# Patient Record
Sex: FEMALE | Race: BLACK OR AFRICAN AMERICAN | NOT HISPANIC OR LATINO | URBAN - METROPOLITAN AREA
[De-identification: names, ages, dates, MRNs, and addresses within clinical notes are randomized per-mention and may not be internally consistent; named-entity substitution may affect disease eponyms.]

---

## 2022-01-01 ENCOUNTER — INPATIENT (INPATIENT)
Facility: HOSPITAL | Age: 22
LOS: 0 days | Discharge: LEFT BEFORE DISCHARGE | End: 2022-01-02
Attending: STUDENT IN AN ORGANIZED HEALTH CARE EDUCATION/TRAINING PROGRAM | Admitting: STUDENT IN AN ORGANIZED HEALTH CARE EDUCATION/TRAINING PROGRAM
Payer: COMMERCIAL

## 2022-01-01 VITALS
TEMPERATURE: 102 F | RESPIRATION RATE: 18 BRPM | OXYGEN SATURATION: 100 % | HEART RATE: 124 BPM | DIASTOLIC BLOOD PRESSURE: 79 MMHG | SYSTOLIC BLOOD PRESSURE: 124 MMHG

## 2022-01-01 DIAGNOSIS — U07.1 COVID-19: ICD-10-CM

## 2022-01-01 LAB
ALBUMIN SERPL ELPH-MCNC: 4.4 G/DL — SIGNIFICANT CHANGE UP (ref 3.3–5)
ALP SERPL-CCNC: 87 U/L — SIGNIFICANT CHANGE UP (ref 40–120)
ALT FLD-CCNC: 33 U/L — SIGNIFICANT CHANGE UP (ref 4–33)
ANION GAP SERPL CALC-SCNC: 11 MMOL/L — SIGNIFICANT CHANGE UP (ref 7–14)
APPEARANCE CSF: CLEAR — SIGNIFICANT CHANGE UP
APPEARANCE SPUN FLD: COLORLESS — SIGNIFICANT CHANGE UP
APPEARANCE UR: CLEAR — SIGNIFICANT CHANGE UP
APTT BLD: 30.9 SEC — SIGNIFICANT CHANGE UP (ref 27–36.3)
AST SERPL-CCNC: 29 U/L — SIGNIFICANT CHANGE UP (ref 4–32)
B PERT DNA SPEC QL NAA+PROBE: SIGNIFICANT CHANGE UP
B PERT+PARAPERT DNA PNL SPEC NAA+PROBE: SIGNIFICANT CHANGE UP
BACTERIA # UR AUTO: NEGATIVE — SIGNIFICANT CHANGE UP
BASOPHILS # BLD AUTO: 0.02 K/UL — SIGNIFICANT CHANGE UP (ref 0–0.2)
BASOPHILS NFR BLD AUTO: 0.3 % — SIGNIFICANT CHANGE UP (ref 0–2)
BILIRUB SERPL-MCNC: <0.2 MG/DL — SIGNIFICANT CHANGE UP (ref 0.2–1.2)
BILIRUB UR-MCNC: NEGATIVE — SIGNIFICANT CHANGE UP
BLOOD GAS VENOUS COMPREHENSIVE RESULT: SIGNIFICANT CHANGE UP
BORDETELLA PARAPERTUSSIS (RAPRVP): SIGNIFICANT CHANGE UP
BUN SERPL-MCNC: 7 MG/DL — SIGNIFICANT CHANGE UP (ref 7–23)
C PNEUM DNA SPEC QL NAA+PROBE: SIGNIFICANT CHANGE UP
CALCIUM SERPL-MCNC: 9.3 MG/DL — SIGNIFICANT CHANGE UP (ref 8.4–10.5)
CHLORIDE SERPL-SCNC: 105 MMOL/L — SIGNIFICANT CHANGE UP (ref 98–107)
CO2 SERPL-SCNC: 22 MMOL/L — SIGNIFICANT CHANGE UP (ref 22–31)
COLOR CSF: COLORLESS — SIGNIFICANT CHANGE UP
COLOR SPEC: SIGNIFICANT CHANGE UP
CREAT SERPL-MCNC: 0.74 MG/DL — SIGNIFICANT CHANGE UP (ref 0.5–1.3)
CRP SERPL-MCNC: 6.2 MG/L — HIGH
DIFF PNL FLD: NEGATIVE — SIGNIFICANT CHANGE UP
EOSINOPHIL # BLD AUTO: 0.05 K/UL — SIGNIFICANT CHANGE UP (ref 0–0.5)
EOSINOPHIL NFR BLD AUTO: 0.8 % — SIGNIFICANT CHANGE UP (ref 0–6)
EPI CELLS # UR: 1 /HPF — SIGNIFICANT CHANGE UP (ref 0–5)
FLUAV SUBTYP SPEC NAA+PROBE: SIGNIFICANT CHANGE UP
FLUBV RNA SPEC QL NAA+PROBE: SIGNIFICANT CHANGE UP
GLUCOSE CSF-MCNC: 59 MG/DL — SIGNIFICANT CHANGE UP (ref 40–70)
GLUCOSE SERPL-MCNC: 94 MG/DL — SIGNIFICANT CHANGE UP (ref 70–99)
GLUCOSE UR QL: NEGATIVE — SIGNIFICANT CHANGE UP
HADV DNA SPEC QL NAA+PROBE: SIGNIFICANT CHANGE UP
HCOV 229E RNA SPEC QL NAA+PROBE: SIGNIFICANT CHANGE UP
HCOV HKU1 RNA SPEC QL NAA+PROBE: SIGNIFICANT CHANGE UP
HCOV NL63 RNA SPEC QL NAA+PROBE: SIGNIFICANT CHANGE UP
HCOV OC43 RNA SPEC QL NAA+PROBE: SIGNIFICANT CHANGE UP
HCT VFR BLD CALC: 36.8 % — SIGNIFICANT CHANGE UP (ref 34.5–45)
HGB BLD-MCNC: 11.6 G/DL — SIGNIFICANT CHANGE UP (ref 11.5–15.5)
HIV 1+2 AB+HIV1 P24 AG SERPL QL IA: SIGNIFICANT CHANGE UP
HMPV RNA SPEC QL NAA+PROBE: SIGNIFICANT CHANGE UP
HPIV1 RNA SPEC QL NAA+PROBE: SIGNIFICANT CHANGE UP
HPIV2 RNA SPEC QL NAA+PROBE: SIGNIFICANT CHANGE UP
HPIV3 RNA SPEC QL NAA+PROBE: SIGNIFICANT CHANGE UP
HPIV4 RNA SPEC QL NAA+PROBE: SIGNIFICANT CHANGE UP
IANC: 4.83 K/UL — SIGNIFICANT CHANGE UP (ref 1.5–8.5)
IMM GRANULOCYTES NFR BLD AUTO: 0.5 % — SIGNIFICANT CHANGE UP (ref 0–1.5)
INR BLD: 1.23 RATIO — HIGH (ref 0.88–1.16)
KETONES UR-MCNC: NEGATIVE — SIGNIFICANT CHANGE UP
LEUKOCYTE ESTERASE UR-ACNC: NEGATIVE — SIGNIFICANT CHANGE UP
LYMPHOCYTES # BLD AUTO: 0.53 K/UL — LOW (ref 1–3.3)
LYMPHOCYTES # BLD AUTO: 8.9 % — LOW (ref 13–44)
LYMPHOCYTES # CSF: 81 % — SIGNIFICANT CHANGE UP
M PNEUMO DNA SPEC QL NAA+PROBE: SIGNIFICANT CHANGE UP
MCHC RBC-ENTMCNC: 28 PG — SIGNIFICANT CHANGE UP (ref 27–34)
MCHC RBC-ENTMCNC: 31.5 GM/DL — LOW (ref 32–36)
MCV RBC AUTO: 88.9 FL — SIGNIFICANT CHANGE UP (ref 80–100)
MONOCYTES # BLD AUTO: 0.49 K/UL — SIGNIFICANT CHANGE UP (ref 0–0.9)
MONOCYTES NFR BLD AUTO: 8.2 % — SIGNIFICANT CHANGE UP (ref 2–14)
MONOS+MACROS NFR CSF: 13 % — SIGNIFICANT CHANGE UP
NEUTROPHILS # BLD AUTO: 4.83 K/UL — SIGNIFICANT CHANGE UP (ref 1.8–7.4)
NEUTROPHILS # CSF: 6 % — SIGNIFICANT CHANGE UP
NEUTROPHILS NFR BLD AUTO: 81.3 % — HIGH (ref 43–77)
NITRITE UR-MCNC: NEGATIVE — SIGNIFICANT CHANGE UP
NRBC # BLD: 0 /100 WBCS — SIGNIFICANT CHANGE UP
NRBC # FLD: 0 K/UL — SIGNIFICANT CHANGE UP
NRBC NFR CSF: 3 CELLS/UL — SIGNIFICANT CHANGE UP (ref 0–5)
PH UR: 8 — SIGNIFICANT CHANGE UP (ref 5–8)
PLATELET # BLD AUTO: 365 K/UL — SIGNIFICANT CHANGE UP (ref 150–400)
POTASSIUM SERPL-MCNC: 3.6 MMOL/L — SIGNIFICANT CHANGE UP (ref 3.5–5.3)
POTASSIUM SERPL-SCNC: 3.6 MMOL/L — SIGNIFICANT CHANGE UP (ref 3.5–5.3)
PROCALCITONIN SERPL-MCNC: 0.07 NG/ML — SIGNIFICANT CHANGE UP (ref 0.02–0.1)
PROT CSF-MCNC: 22 MG/DL — SIGNIFICANT CHANGE UP (ref 15–45)
PROT SERPL-MCNC: 7.9 G/DL — SIGNIFICANT CHANGE UP (ref 6–8.3)
PROT UR-MCNC: NEGATIVE — SIGNIFICANT CHANGE UP
PROTHROM AB SERPL-ACNC: 13.9 SEC — HIGH (ref 10.6–13.6)
RAPID RVP RESULT: DETECTED
RBC # BLD: 4.14 M/UL — SIGNIFICANT CHANGE UP (ref 3.8–5.2)
RBC # CSF: 33 CELLS/UL — HIGH (ref 0–0)
RBC # FLD: 14 % — SIGNIFICANT CHANGE UP (ref 10.3–14.5)
RBC CASTS # UR COMP ASSIST: 1 /HPF — SIGNIFICANT CHANGE UP (ref 0–4)
RSV RNA SPEC QL NAA+PROBE: SIGNIFICANT CHANGE UP
RV+EV RNA SPEC QL NAA+PROBE: SIGNIFICANT CHANGE UP
SARS-COV-2 RNA SPEC QL NAA+PROBE: DETECTED
SODIUM SERPL-SCNC: 138 MMOL/L — SIGNIFICANT CHANGE UP (ref 135–145)
SP GR SPEC: 1.02 — SIGNIFICANT CHANGE UP (ref 1–1.05)
TOTAL CELLS COUNTED, SPINAL FLUID: 64 CELLS — SIGNIFICANT CHANGE UP
TUBE TYPE: SIGNIFICANT CHANGE UP
UROBILINOGEN FLD QL: SIGNIFICANT CHANGE UP
WBC # BLD: 5.95 K/UL — SIGNIFICANT CHANGE UP (ref 3.8–10.5)
WBC # FLD AUTO: 5.95 K/UL — SIGNIFICANT CHANGE UP (ref 3.8–10.5)
WBC UR QL: 1 /HPF — SIGNIFICANT CHANGE UP (ref 0–5)

## 2022-01-01 PROCEDURE — 72125 CT NECK SPINE W/O DYE: CPT | Mod: 26,MA

## 2022-01-01 PROCEDURE — 70450 CT HEAD/BRAIN W/O DYE: CPT | Mod: 26,MA

## 2022-01-01 PROCEDURE — 71045 X-RAY EXAM CHEST 1 VIEW: CPT | Mod: 26

## 2022-01-01 PROCEDURE — 99053 MED SERV 10PM-8AM 24 HR FAC: CPT

## 2022-01-01 PROCEDURE — 62270 DX LMBR SPI PNXR: CPT

## 2022-01-01 PROCEDURE — 72131 CT LUMBAR SPINE W/O DYE: CPT | Mod: 26,MA

## 2022-01-01 PROCEDURE — 99285 EMERGENCY DEPT VISIT HI MDM: CPT | Mod: 25

## 2022-01-01 PROCEDURE — 72128 CT CHEST SPINE W/O DYE: CPT | Mod: 26,MA

## 2022-01-01 RX ORDER — ACYCLOVIR SODIUM 500 MG
800 VIAL (EA) INTRAVENOUS ONCE
Refills: 0 | Status: COMPLETED | OUTPATIENT
Start: 2022-01-01 | End: 2022-01-01

## 2022-01-01 RX ORDER — ACETAMINOPHEN 500 MG
975 TABLET ORAL ONCE
Refills: 0 | Status: COMPLETED | OUTPATIENT
Start: 2022-01-01 | End: 2022-01-01

## 2022-01-01 RX ORDER — CEFTRIAXONE 500 MG/1
2000 INJECTION, POWDER, FOR SOLUTION INTRAMUSCULAR; INTRAVENOUS ONCE
Refills: 0 | Status: COMPLETED | OUTPATIENT
Start: 2022-01-01 | End: 2022-01-01

## 2022-01-01 RX ORDER — SODIUM CHLORIDE 9 MG/ML
1000 INJECTION, SOLUTION INTRAVENOUS ONCE
Refills: 0 | Status: COMPLETED | OUTPATIENT
Start: 2022-01-01 | End: 2022-01-01

## 2022-01-01 RX ORDER — VANCOMYCIN HCL 1 G
1000 VIAL (EA) INTRAVENOUS ONCE
Refills: 0 | Status: COMPLETED | OUTPATIENT
Start: 2022-01-01 | End: 2022-01-01

## 2022-01-01 RX ADMIN — Medication 250 MILLIGRAM(S): at 21:04

## 2022-01-01 RX ADMIN — SODIUM CHLORIDE 2000 MILLILITER(S): 9 INJECTION, SOLUTION INTRAVENOUS at 19:47

## 2022-01-01 RX ADMIN — Medication 975 MILLIGRAM(S): at 19:47

## 2022-01-01 RX ADMIN — Medication 266 MILLIGRAM(S): at 22:10

## 2022-01-01 RX ADMIN — CEFTRIAXONE 100 MILLIGRAM(S): 500 INJECTION, POWDER, FOR SOLUTION INTRAMUSCULAR; INTRAVENOUS at 20:30

## 2022-01-01 NOTE — ED PROVIDER NOTE - OBJECTIVE STATEMENT
21F hx of migraines, recent pregnancy, delivered 11/10/2021, had an epidural. Patient presents with diffuse headache but worse on left side. Pressure-like, radiates to spine. Spinal pain feels like tightness in the upper spine, worse lying flat and on left side. Difficulty walking, feels like pain in feet and ankles. + lightheadedness and dizziness with walking, transient. Denies new rash. Lives in shelter at present. states pregnancy was uncomplicated. Denies chest pain, shortness of breath. Mentioned having suprapubic abd pain and blood stool 1 week ago, self limited. No other episodes since. 21F hx of migraines, recent pregnancy, delivered 11/10/2021, had an epidural. Patient presents with diffuse headache but worse on left side. Endorses new onset ataxia with b/l leg weakness. Pressure-like, radiates to spine. Spinal pain feels like tightness in the upper spine, worse lying flat and on left side. Difficulty walking, feels like pain in feet and ankles. + lightheadedness and dizziness with walking, transient. Denies new rash. Lives in shelter at present. states pregnancy was uncomplicated. Denies chest pain, shortness of breath. Mentioned having suprapubic abd pain and blood stool 1 week ago, self limited. No other episodes since.

## 2022-01-01 NOTE — ED ADULT NURSE NOTE - CHIEF COMPLAINT QUOTE
headache x past mo. nausea. blurred vision with balance off. states symptoms feel more than usual migranes. last took motrin 2 days. denies fever. reports pain to legs,hands ,back x 3 wks. nvd 7 wks ago, no pedal edema, pmh- bipolar,migrane. nvd 11/10  fs 80  epidural 11/10.. pt lives in shelter since tuesday. denies vag discharge

## 2022-01-01 NOTE — ED ADULT NURSE NOTE - NSIMPLEMENTINTERV_GEN_ALL_ED
PROCEDURE: CT angiography of the chest with contrast.



TECHNIQUE: Multiple contiguous axial images were obtained through

the chest after uneventful bolus administration of intravenous

contrast. 3D reconstructed CTA MIP acquisitions were also

performed. Auto Exposure Controls were utilized during the CT

exam to meet ALARA standards for radiation dose reduction. 



INDICATION:  Chest pain and shortness of air.



COMPARISON: No prior studies are available for comparison.



FINDINGS: Evaluation of the pulmonary arterial system is without

thromboembolism. No filling defects are seen within central,

lobar, or segmental branches. The thoracic aorta is normal in

caliber. No dissection is seen. Aberrant right subclavian artery

in a retroesophageal location is noted, a normal variant. No

pericardial or pleural fluid is identified. No pulmonary

infiltrates, nodules, or masses are seen. Upper abdomen is

unremarkable.



IMPRESSION: No evidence of pulmonary embolism or thoracic aortic

dissection.



Dictated by: 



  Dictated on workstation # [id number] Implemented All Fall Risk Interventions:  [place] to call system. Call bell, personal items and telephone within reach. Instruct patient to call for assistance. Room bathroom lighting operational. Non-slip footwear when patient is off stretcher. Physically safe environment: no spills, clutter or unnecessary equipment. Stretcher in lowest position, wheels locked, appropriate side rails in place. Provide visual cue, wrist band, yellow gown, etc. Monitor gait and stability. Monitor for mental status changes and reorient to person, place, and time. Review medications for side effects contributing to fall risk. Reinforce activity limits and safety measures with patient and family.

## 2022-01-01 NOTE — ED ADULT NURSE NOTE - NSFALLRSKINDICTYPE_ED_ALL_ED
[FreeTextEntry1] : Despite his claim he is not better, today's testing and drawing/handwriting are noticeably better than one month ago. Encouraged to increase primidone stepwise (schedule provided) upto 50 in AM and 100 mg PM and return for follow up. Explained the likelihood of familiaar/essential tremor based on physical exam and DG scan\par He wanted to understand what the "white spots on the MRI" means. 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 Reinforced how control of diabetes mellitus hypertension and hyperlipidemia will prevent worsening and that worsening /progression of lipohyalinosis could cause vascular dementia, ataxia etc. 3 Impaired Gait/Need for Mobility Assisted Device

## 2022-01-01 NOTE — ED PROVIDER NOTE - CLINICAL SUMMARY MEDICAL DECISION MAKING FREE TEXT BOX
21F hx of migraines, recent pregnancy, delivered 11/10/2021, had an epidural at that time. New ataxia and reported LE weakness despite examination. History concerning for possible spinal infection given recent procedure and fever with neurologic deficit. Consider MRI spine for epidural abscess. Will obtain cbc, cmp, bc, rpr, esr, crp iv abx vanco/cefepime/acyclovir, ct head. Viral panel screen for possible alternative etiology of fever. Discussed risks vs benefits of LP with the pt for concern for infectious etiology. Will consider LP pending CT head. GBS less likely given lack or ascending findings or motor/sensory deficit. Transverse myelitis less likely. Consider discitis however no point spinal TTP. Admit.

## 2022-01-01 NOTE — ED PROVIDER NOTE - PHYSICAL EXAMINATION
GEN - NAD; well appearing; A&O x3, Febrile 102F  HEAD - NC/AT   EYES- PERRL, EOMI  ENT: Airway patent, mmm  PULMONARY - CTA b/l, symmetric breath sounds. No W/R/R.  CARDIAC -s1s2, RRR, no M,G,R, No JVD  ABDOMEN - +BS, ND, NT, soft, no guarding, no rebound, no masses , no rigidity  : No CVA TTP, no suprapubic TTP  BACK - Normal  spine   EXTREMITIES - FROM, symmetric pulses, capillary refill < 2 seconds, no edema, 5/5 strength in b/l UE and LE  SKIN - no rash or bruising   NEUROLOGIC - alert, speech clear, +broad based staggering gate, CN II-XII grossly intact, sensation grossly intact. Chaperone BEA Bell, rectal tone intact, no saddle anesthesia or sensory loss. B/l patellar DTR 1+, b/l babinski negative.  PSYCH -nl mood/affect, nl insight.

## 2022-01-01 NOTE — ED PROCEDURE NOTE - ATTENDING CONTRIBUTION TO CARE
I have personally performed a history and physical examination of the patient and discussed management with the resident as well as the patient.  I reviewed the resident's note and agree with the documented findings and plan of care.  I have authored and modified critical sections of the Provider Note, including but not limited to HPI, Physical Exam and MDM.    Traumatic tap LP without additional complications.

## 2022-01-01 NOTE — ED ADULT TRIAGE NOTE - CHIEF COMPLAINT QUOTE
headache x past mo. nausea. blurred vision with balance off. states symptoms feel more than usual migranes. last took motrin 2 days. denies fever. reports pain to legs,hands ,back x 3 wks. nvd 7 wks ago, no pedal edema, pmh- bipolar,migrane. nvd 11/10 headache x past mo. nausea. blurred vision with balance off. states symptoms feel more than usual migranes. last took motrin 2 days. denies fever. reports pain to legs,hands ,back x 3 wks. nvd 7 wks ago, no pedal edema, pmh- bipolar,migrane. nvd 11/10  fs 80 headache x past mo. nausea. blurred vision with balance off. states symptoms feel more than usual migranes. last took motrin 2 days. denies fever. reports pain to legs,hands ,back x 3 wks. nvd 7 wks ago, no pedal edema, pmh- bipolar,migrane. nvd 11/10  fs 80  epidural 11/10.. pt lives in shelter since tuesday. denies vag discharge

## 2022-01-01 NOTE — ED PROVIDER NOTE - NS ED ROS FT
ROS   GENERAL: No fever, chills, malaise, fatigue   ENT No earache, coryza, sore throat   NECK: No stiffness swollen glands or dysphagia   RESPIRATORY: No cough, dyspnea, pleuritic chest pain   HEART: no chest pain   ABDOMEN: No abdominal pain, nausea, vomiting or diarrhea   : No dysuria, increase frequency or urgency, No discharge   MUSCULAR-SKELETAL: No myalgia, arthralgia   NEUROLOGY: No headache, vertigo, paresthesia, diplopia, +ataxia and loss of balance with standing  SKIN: No rash, abrasion   All other ROS are negative

## 2022-01-01 NOTE — ED PROVIDER NOTE - ATTENDING CONTRIBUTION TO CARE
I have personally performed a history and physical examination of the patient and discussed management with the resident as well as the patient.  I reviewed the resident's note and agree with the documented findings and plan of care.  I have authored and modified critical sections of the Provider Note, including but not limited to HPI, Physical Exam and MDM.    21F hx of migraines, recent pregnancy, delivered 11/10/2021, had an epidural at that time. New ataxia and reported LE weakness despite examination. History concerning for possible spinal infection given recent procedure and fever with neurologic deficit. Consider MRI spine for epidural abscess. Will obtain cbc, cmp, bc, rpr, esr, crp iv abx vanco/cefepime/acyclovir, ct head. Viral panel screen for possible alternative etiology of fever. Discussed risks vs benefits of LP with the pt for concern for infectious etiology. Will consider LP pending CT head. GBS less likely given lack or ascending findings or motor/sensory deficit. Transverse myelitis less likely. Consider discitis however no point spinal TTP. Admit.

## 2022-01-01 NOTE — ED PROVIDER NOTE - PROGRESS NOTE DETAILS
Surendra Dennison D.O., PGY3 (Resident)  Cov +. Ct nonactionable. Labs nonactionable. LP done, pending results. Discussed admission with patient and hospitalist. Amenable. Admit. Unclear if patient has herpes encephalitis/meningitis but csf fluid clear. Unclear if underlying spinal pathology.

## 2022-01-01 NOTE — ED ADULT NURSE NOTE - OBJECTIVE STATEMENT
Facilitator RN Note: Received pt into spot #4. Pt does not appear in acute distress but c/o left sided headache x 4 wks with upper spine pain and feelingoff balance with walking. Pt also c/o pain to bilateral ankles and feet. Gait unsteady when walking. Pt appears to be lifting foot with each step to get bearings before stepping. Denies numbness or tingling but states it is because of the pain that is making her walk that way. Fever today. Denies any fevers previous days. 101.7F oral  now. Pt complains of posterior neck pain/pulling sensation with moving neck from side to side and head up and down. Dr. Burt and Dr. Dennison in to eval pt. Blood work obtained and sent. #20 angio placed to RAC , NS bolus started. Tylenol 650 mg oral dose given for fever. Pt taken to bathroom via wheelchair. Instructed to not get up out of bed on her own. Pt verbalized understanding. Report given to primary RN Armand.

## 2022-01-01 NOTE — ED PROVIDER NOTE - CARE PLAN
1 Principal Discharge DX:	2019 novel coronavirus disease (COVID-19)  Secondary Diagnosis:	Unsteady gait

## 2022-01-02 VITALS
RESPIRATION RATE: 17 BRPM | DIASTOLIC BLOOD PRESSURE: 78 MMHG | TEMPERATURE: 98 F | SYSTOLIC BLOOD PRESSURE: 117 MMHG | OXYGEN SATURATION: 100 % | HEART RATE: 100 BPM

## 2022-01-02 DIAGNOSIS — R51.9 HEADACHE, UNSPECIFIED: ICD-10-CM

## 2022-01-02 DIAGNOSIS — R29.898 OTHER SYMPTOMS AND SIGNS INVOLVING THE MUSCULOSKELETAL SYSTEM: ICD-10-CM

## 2022-01-02 DIAGNOSIS — A41.89 OTHER SPECIFIED SEPSIS: ICD-10-CM

## 2022-01-02 DIAGNOSIS — H53.8 OTHER VISUAL DISTURBANCES: ICD-10-CM

## 2022-01-02 DIAGNOSIS — F31.9 BIPOLAR DISORDER, UNSPECIFIED: ICD-10-CM

## 2022-01-02 DIAGNOSIS — U07.1 COVID-19: ICD-10-CM

## 2022-01-02 DIAGNOSIS — R26.0 ATAXIC GAIT: ICD-10-CM

## 2022-01-02 DIAGNOSIS — Z29.9 ENCOUNTER FOR PROPHYLACTIC MEASURES, UNSPECIFIED: ICD-10-CM

## 2022-01-02 LAB
A1C WITH ESTIMATED AVERAGE GLUCOSE RESULT: 4.8 % — SIGNIFICANT CHANGE UP (ref 4–5.6)
ANION GAP SERPL CALC-SCNC: 12 MMOL/L — SIGNIFICANT CHANGE UP (ref 7–14)
BASOPHILS # BLD AUTO: 0.02 K/UL — SIGNIFICANT CHANGE UP (ref 0–0.2)
BASOPHILS NFR BLD AUTO: 0.4 % — SIGNIFICANT CHANGE UP (ref 0–2)
BUN SERPL-MCNC: 6 MG/DL — LOW (ref 7–23)
CALCIUM SERPL-MCNC: 9 MG/DL — SIGNIFICANT CHANGE UP (ref 8.4–10.5)
CHLORIDE SERPL-SCNC: 105 MMOL/L — SIGNIFICANT CHANGE UP (ref 98–107)
CHOLEST SERPL-MCNC: 102 MG/DL — SIGNIFICANT CHANGE UP
CK SERPL-CCNC: 75 U/L — SIGNIFICANT CHANGE UP (ref 25–170)
CO2 SERPL-SCNC: 21 MMOL/L — LOW (ref 22–31)
CORTIS AM PEAK SERPL-MCNC: 9.6 UG/DL — SIGNIFICANT CHANGE UP (ref 6–18.4)
CREAT SERPL-MCNC: 0.69 MG/DL — SIGNIFICANT CHANGE UP (ref 0.5–1.3)
CRP SERPL-MCNC: 12.7 MG/L — HIGH
CSF PCR RESULT: SIGNIFICANT CHANGE UP
CULTURE RESULTS: SIGNIFICANT CHANGE UP
D DIMER BLD IA.RAPID-MCNC: 189 NG/ML DDU — SIGNIFICANT CHANGE UP
EOSINOPHIL # BLD AUTO: 0.02 K/UL — SIGNIFICANT CHANGE UP (ref 0–0.5)
EOSINOPHIL NFR BLD AUTO: 0.4 % — SIGNIFICANT CHANGE UP (ref 0–6)
ERYTHROCYTE [SEDIMENTATION RATE] IN BLOOD: 15 MM/HR — SIGNIFICANT CHANGE UP (ref 4–25)
ESTIMATED AVERAGE GLUCOSE: 91 — SIGNIFICANT CHANGE UP
FERRITIN SERPL-MCNC: 51 NG/ML — SIGNIFICANT CHANGE UP (ref 15–150)
GLUCOSE SERPL-MCNC: 87 MG/DL — SIGNIFICANT CHANGE UP (ref 70–99)
GRAM STN FLD: SIGNIFICANT CHANGE UP
HCT VFR BLD CALC: 33.2 % — LOW (ref 34.5–45)
HDLC SERPL-MCNC: 41 MG/DL — LOW
HGB BLD-MCNC: 10.8 G/DL — LOW (ref 11.5–15.5)
IANC: 3.23 K/UL — SIGNIFICANT CHANGE UP (ref 1.5–8.5)
IMM GRANULOCYTES NFR BLD AUTO: 0.4 % — SIGNIFICANT CHANGE UP (ref 0–1.5)
LABORATORY COMMENT REPORT: SIGNIFICANT CHANGE UP
LDH CSF L TO P-CCNC: 15 U/L — SIGNIFICANT CHANGE UP
LDH FLD-CCNC: 15 U/L — SIGNIFICANT CHANGE UP
LIPID PNL WITH DIRECT LDL SERPL: 51 MG/DL — SIGNIFICANT CHANGE UP
LYMPHOCYTES # BLD AUTO: 0.73 K/UL — LOW (ref 1–3.3)
LYMPHOCYTES # BLD AUTO: 16.2 % — SIGNIFICANT CHANGE UP (ref 13–44)
MAGNESIUM SERPL-MCNC: 1.6 MG/DL — SIGNIFICANT CHANGE UP (ref 1.6–2.6)
MCHC RBC-ENTMCNC: 27.9 PG — SIGNIFICANT CHANGE UP (ref 27–34)
MCHC RBC-ENTMCNC: 32.5 GM/DL — SIGNIFICANT CHANGE UP (ref 32–36)
MCV RBC AUTO: 85.8 FL — SIGNIFICANT CHANGE UP (ref 80–100)
MONOCYTES # BLD AUTO: 0.48 K/UL — SIGNIFICANT CHANGE UP (ref 0–0.9)
MONOCYTES NFR BLD AUTO: 10.7 % — SIGNIFICANT CHANGE UP (ref 2–14)
NEUTROPHILS # BLD AUTO: 3.23 K/UL — SIGNIFICANT CHANGE UP (ref 1.8–7.4)
NEUTROPHILS NFR BLD AUTO: 71.9 % — SIGNIFICANT CHANGE UP (ref 43–77)
NON HDL CHOLESTEROL: 61 MG/DL — SIGNIFICANT CHANGE UP
NRBC # BLD: 0 /100 WBCS — SIGNIFICANT CHANGE UP
NRBC # FLD: 0 K/UL — SIGNIFICANT CHANGE UP
PHOSPHATE SERPL-MCNC: 3.8 MG/DL — SIGNIFICANT CHANGE UP (ref 2.5–4.5)
PLATELET # BLD AUTO: 329 K/UL — SIGNIFICANT CHANGE UP (ref 150–400)
POTASSIUM SERPL-MCNC: 3.5 MMOL/L — SIGNIFICANT CHANGE UP (ref 3.5–5.3)
POTASSIUM SERPL-SCNC: 3.5 MMOL/L — SIGNIFICANT CHANGE UP (ref 3.5–5.3)
RBC # BLD: 3.87 M/UL — SIGNIFICANT CHANGE UP (ref 3.8–5.2)
RBC # FLD: 14.4 % — SIGNIFICANT CHANGE UP (ref 10.3–14.5)
SODIUM SERPL-SCNC: 138 MMOL/L — SIGNIFICANT CHANGE UP (ref 135–145)
SOURCE HSV 1/2: SIGNIFICANT CHANGE UP
SPECIMEN SOURCE: SIGNIFICANT CHANGE UP
SPECIMEN SOURCE: SIGNIFICANT CHANGE UP
T PALLIDUM AB TITR SER: NEGATIVE — SIGNIFICANT CHANGE UP
T3 SERPL-MCNC: 99 NG/DL — SIGNIFICANT CHANGE UP (ref 80–200)
T4 FREE SERPL-MCNC: 0.9 NG/DL — SIGNIFICANT CHANGE UP (ref 0.9–1.8)
TRIGL SERPL-MCNC: 50 MG/DL — SIGNIFICANT CHANGE UP
TSH SERPL-MCNC: 0.63 UIU/ML — SIGNIFICANT CHANGE UP (ref 0.27–4.2)
WBC # BLD: 4.5 K/UL — SIGNIFICANT CHANGE UP (ref 3.8–10.5)
WBC # FLD AUTO: 4.5 K/UL — SIGNIFICANT CHANGE UP (ref 3.8–10.5)

## 2022-01-02 PROCEDURE — 99255 IP/OBS CONSLTJ NEW/EST HI 80: CPT

## 2022-01-02 PROCEDURE — 70496 CT ANGIOGRAPHY HEAD: CPT | Mod: 26

## 2022-01-02 PROCEDURE — 70498 CT ANGIOGRAPHY NECK: CPT | Mod: 26

## 2022-01-02 PROCEDURE — 99223 1ST HOSP IP/OBS HIGH 75: CPT

## 2022-01-02 RX ORDER — SODIUM CHLORIDE 9 MG/ML
3 INJECTION INTRAMUSCULAR; INTRAVENOUS; SUBCUTANEOUS EVERY 8 HOURS
Refills: 0 | Status: DISCONTINUED | OUTPATIENT
Start: 2022-01-02 | End: 2022-01-02

## 2022-01-02 RX ORDER — INFLUENZA VIRUS VACCINE 15; 15; 15; 15 UG/.5ML; UG/.5ML; UG/.5ML; UG/.5ML
0.5 SUSPENSION INTRAMUSCULAR ONCE
Refills: 0 | Status: DISCONTINUED | OUTPATIENT
Start: 2022-01-02 | End: 2022-01-02

## 2022-01-02 RX ORDER — ARIPIPRAZOLE 15 MG/1
0 TABLET ORAL
Qty: 0 | Refills: 0 | DISCHARGE

## 2022-01-02 RX ADMIN — SODIUM CHLORIDE 3 MILLILITER(S): 9 INJECTION INTRAMUSCULAR; INTRAVENOUS; SUBCUTANEOUS at 05:15

## 2022-01-02 RX ADMIN — SODIUM CHLORIDE 3 MILLILITER(S): 9 INJECTION INTRAMUSCULAR; INTRAVENOUS; SUBCUTANEOUS at 12:45

## 2022-01-02 NOTE — H&P ADULT - MUSCULOSKELETAL
ROM intact/no calf tenderness/normal strength detailed exam details… ROM intact/no joint swelling/no joint erythema/no calf tenderness/normal strength

## 2022-01-02 NOTE — H&P ADULT - GAIT/BALANCE
Gait: able to stand up, requires some assistance due to instability, broad base; Gait: able to stand up, requires assistance due to instability, broad base;

## 2022-01-02 NOTE — H&P ADULT - HISTORY OF PRESENT ILLNESS
22 y/o F with PMH of Migraines, recent uncomplicated pregnancy (delivered 11/10/2021, had epidural) presents to the ED with diffuse headache worse on left side of her head and ataxic gait. 22 y/o F with PMH of Migraines, recent uncomplicated pregnancy (delivered 11/10/2021, had epidural) presents to the ED with diffuse headache worse on left side of her head and ataxic gait. Patient reports headache started approximately  1 month ago and has progressively worsened. Patient describes headache as constant and diffuse with pain being worse on left side. Patient reports she is usually able to get relief from her migraines with ibuprofen and hot shower, but reports she has not been able to get any relief. Patient denies photophobia. Patient also endorses 1 week of weakness in both upper and lower extremities. Patient reports weakness is worse in lower extremities compared to upper extremities. Patient also endorses lightheadedness with ambulation. She reports feeling unsteady and denies room-spinning. Patient also reports blurry vision which started 2 days ago. Patient denies chest pain, nausea and vomiting, tremors.    In ED 22 y/o F with PMH of Migraines, recent uncomplicated pregnancy (delivered 11/10/2021, had epidural) presents to the ED with diffuse headache worse on left side of her head and ataxic gait. Patient reports headache started approximately  1 month ago and has progressively worsened. Patient describes headache as constant and diffuse with pain being worse on left side. Patient reports she is usually able to get relief from her migraines with ibuprofen and hot shower, but reports she has not been able to get any relief. Patient denies photophobia. Patient endorses  back pain in cervical spine Patient also endorses 1 week of weakness in both upper and lower extremities. Patient reports weakness is worse in lower extremities compared to upper extremities. Patient also endorses lightheadedness with ambulation. She reports feeling unsteady and denies room-spinning. Patient also reports blurry vision which started 2 days ago. Patient denies chest pain, nausea and vomiting, tremors.    In ED CT brain showed no acute displaced calvarial fracture.No acute hemorrhage or mass effect. CT T and L spine showed no acute displaced fractures and no acute traumatic subluxations. Spinal tap was performed in ED which showed RBC of 33 on CSF. Patient found to be COVID+ Patient is vaccinated and received 2 doses of Pfizer.    22 y/o F with PMH of Migraines, recent uncomplicated pregnancy (delivered 11/10/2021, had epidural) presents to the ED with diffuse headache worse on left side of her head and ataxic gait. Patient reports headache started approximately  1 month ago and has progressively worsened. Patient describes headache as constant and diffuse with pain being worse on left side. Patient reports she is usually able to get relief from her migraines with ibuprofen and hot shower, but reports she has not been able to get any relief. Patient denies photophobia. Patient endorses  back pain in cervical spine which she describes as tightness. Patient also endorses 1 week of weakness in both upper and lower extremities. Patient reports weakness is worse in lower extremities compared to upper extremities. Patient also endorses lightheadedness with ambulation. She reports feeling unsteady and denies room-spinning. Patient also reports blurry vision which started 2 days ago.  Patient endorses pain in ankles. Patient denies chest pain, nausea and vomiting, tremors.    In ED CT brain showed no acute displaced calvarial fracture. No acute hemorrhage or mass effect. CT T and L spine showed no acute displaced fractures and no acute traumatic subluxations. Spinal tap was performed in ED which showed RBC of 33 on CSF. Patient found to be COVID+ Patient is vaccinated and received 2 doses of Pfizer.    22yo Female with MHx of Migraines, bipolar d/o (off medications due to recent pregnancy), recent uncomplicated pregnancy (delivered 11/10/2021, had epidural) presents to the ED with diffuse headache worse on left side of her head with associated unstable gait. Patient reports headache started approximately  1 month ago, 2 weeks after delivery, and has progressively worsened. Patient describes headache as constant and diffuse with pain being worse on left side. Patient reports she is usually able to get relief from her migraines with ibuprofen and hot shower, but reports she has not been able to get any relief. Patient denies associated photophobia. Patient endorses also dull back pain in cervical spine which she describes as tightness. Patient also endorses 1 week of weakness in b/l upper and lower extremities. Patient reports weakness is worse in lower extremities compared to upper extremities. Patient also endorses lightheadedness with ambulation. She reports feeling unsteady but denies room-spinning sensation. Patient also reports blurry vision which started 2 days ago.  Patient endorses pain in ankles. Patient denies chest pain, nausea and vomiting, tremors.    ED course:  CT brain showed no acute displaced calvarial fracture. No acute hemorrhage or mass effect. CT T and L spine showed no acute displaced fractures and no acute traumatic subluxations. Spinal tap was performed in ED which showed RBC of 33 on CSF. Patient found to be COVID+ Patient is vaccinated and received 2 doses of Pfizer.    20yo Female with MHx of Migraines, bipolar d/o (off medications due to recent pregnancy), recent uncomplicated pregnancy (delivered 11/10/2021, had epidural) presents to the ED with diffuse headache worse on left side of her head with associated unstable gait. Patient reports headache started approximately  1 month ago, 2 weeks after delivery, and has progressively worsened. Patient describes headache as constant and diffuse with pain being worse on left side. Patient reports she is usually able to get relief from her migraines with ibuprofen and hot shower, but reports she has not been able to get any relief. Patient denies associated photophobia. Patient endorses also dull back pain in cervical spine which she describes as tightness. Patient also endorses 1 week of weakness in b/l upper and lower extremities. Patient reports weakness is worse in lower extremities compared to upper extremities. Patient also endorses lightheadedness with ambulation. She reports feeling unsteady but denies room-spinning sensation. Patient also reports blurry vision which started 2 days ago.  Patient endorses pain in ankles. Patient denies chest pain, nausea and vomiting, tremors.    ED course:  Spinal tap was performed in ED which showed RBC of 33 on CSF. Patient found to be COVID+ Patient is vaccinated and received 2 doses of Pfizer.

## 2022-01-02 NOTE — H&P ADULT - PROBLEM SELECTOR PLAN 6
Lovenox 40 mg subcutaneous daily. Patient has been of Abilify due to pregnancy.  Patient reports that medication is supposed to be restarted next week. Patient incidentally found to be COVID-19 +.  CXR no evidence of multifocal PNA   Patient vaccinated with Pfizer, second dose received in July.  Patient 100% on room air. Continue monitoring.  Will order COVID inflammatory markers. Patient incidentally found to be COVID-19 +.  CXR no evidence of multifocal PNA   Vaccinated with Pfizer, second dose received in July.  -on 100% on room air  -monitor O2 sat  -ordered COVID inflammatory markers.  -supportive care

## 2022-01-02 NOTE — H&P ADULT - NSHPSOCIALHISTORY_GEN_ALL_CORE
Patient lives in shelter. Denies use of cigarettes, drugs and alcohol. Patient lives in shelter    Lives with spouse  Denies h/o cigarettes, drugs and alcohol use

## 2022-01-02 NOTE — H&P ADULT - ASSESSMENT
20 y/o F with PMH of Migraines, recent uncomplicated pregnancy (delivered 11/10/2021, had epidural) presents to the ED with diffuse headache worse on left side of her head and ataxic gait.  20yo Female with MHx of Migraines, bipolar d/o (off medications due to recent pregnancy), recent uncomplicated pregnancy (delivered 11/10/2021, had epidural) a/w ataxic gate, HA and blurry vision of unclear etiology; Found also to have viral sepsis due to Covid-19 r/o other etiology;

## 2022-01-02 NOTE — H&P ADULT - PROBLEM SELECTOR PLAN 7
Lovenox 40 mg subcutaneous daily. Patient has been off Abilify due to pregnancy. Reports that has an appointment regarding restarting medications for her bipolar d/o next week;

## 2022-01-02 NOTE — CONSULT NOTE ADULT - SUBJECTIVE AND OBJECTIVE BOX
Neurology - Consult Note - 01-02-22  -  Bear Roper MD  PGY-2 Neurology  Spectra: [phone] ([facility]), [phone] ([facility])  -    Name: PANTERA JONES; 21y (2000)    Reason for Admission: Infection due to severe acute respiratory syndrome coronavirus 2 (SARS-CoV-2)    Chief Complaint:   HPI:      Review of Systems:  INCOMPLETE   CONSTITUTIONAL: No fevers or chills  EYES/ENT: No visual changes or no throat pain   NECK: No pain or stiffness  RESPIRATORY: No hemoptysis or shortness of breath  CARDIOVASCULAR: No chest pain or palpitations  GASTROINTESTINAL: No melena or hematochezia.  GENITOURINARY: No dysuria or hematuria  NEUROLOGICAL: +As stated in HPI above  SKIN: No itching, burning, rashes, or lesions   All other review of systems is negative unless indicated above.    amoxicillin (Rash)      PMHx:   Migraines      PFHx:     PSuHx:       Medications:  MEDICATIONS  (STANDING):    MEDICATIONS  (PRN):      Vitals:  T(C): 37.4 (01-02-22 @ 01:30), Max: 39.1 (01-01-22 @ 18:05)  HR: 80 (01-02-22 @ 01:30) (80 - 124)  BP: 108/70 (01-02-22 @ 01:30) (108/70 - 124/79)  RR: 17 (01-02-22 @ 01:30) (17 - 18)  SpO2: 100% (01-02-22 @ 01:30) (99% - 100%)    Physical Examination: INCOMPLETE    General: in no acute distress, non-diaphoretic  Head: normocephalic, atraumatic  Eyes: non-icteric sclera, no conjunctival injection  Abdomen: soft, non-tender  Extremities: no lower extremity edema, no deformities    Neurologic:    - Mental Status: Alert, awake, oriented to person, place, and time; speech is fluent with intact naming, repetition, and comprehension; good overall fund of knowledge; immediate recall is 3/3 words and delayed recall is 3/3 words at 5 minutes; able to spell WORLD backwards and perform serial 7 subtraction; able to read and write a sentence.    - Cranial Nerves:  II: Visual fields are full to confrontation; pupils are equal, round, and reactive to light   III, IV, VI: Extraocular movements are intact without nystagmus  V: Facial sensation is intact in the V1-V3 distribution bilaterally  VII: Face is symmetric with normal eye closure and smile  VIII: Hearing is intact to conversation  IX, X: Uvula is midline and soft palate rises symmetrically  XI: Head turning intact  XII: Tongue protrudes in the midline    -Motor/Strength Testing:                                 Right           Left  Deltoid                     5                 5  Biceps                      5                 5  Triceps                     5                 5  Wrist Ext (radial)       5                 5  Wrist Flex                 5                 5  Interphalangeal        5                 5  APB (Thumb)            5                 5    Hip Flex                   5                  5  Knee Flex                 5                  5  Knee Ext	      5                  5  Dorsiflex                  5                  5  Plantarflex               5                  5    - There is no pronator drift. Normal muscle bulk and tone throughout.    - Reflexes:   Bicep (C5/C6):                  R 2+ --- L 2+   Brachioradialis (C5/C6) :   R 2+ --- L 2+   Patella (L3/L4) :                 R 2+ --- L 2+   Ankle (S1) :                       R 2+ --- L 2+     - Plant responses down bilaterally.    - Sensory: Intact throughout to light touch.   - Coordination: Finger-nose-finger intact without dysmetria.    - Gait: Normal steps, base, arm swing, and turning. Romberg testing is negative.    Labs:                        11.6   5.95  )-----------( 365      ( 01 Jan 2022 20:09 )             36.8     01-01    138  |  105  |  7   ----------------------------<  94  3.6   |  22  |  0.74    Ca    9.3      01 Jan 2022 20:09    TPro  7.9  /  Alb  4.4  /  TBili  <0.2  /  DBili  x   /  AST  29  /  ALT  33  /  AlkPhos  87  01-01    CAPILLARY BLOOD GLUCOSE      POCT Blood Glucose.: 80 mg/dL (01 Jan 2022 18:25)    LIVER FUNCTIONS - ( 01 Jan 2022 20:09 )  Alb: 4.4 g/dL / Pro: 7.9 g/dL / ALK PHOS: 87 U/L / ALT: 33 U/L / AST: 29 U/L / GGT: x             Culture - CSF with Gram Stain (collected 02 Jan 2022 00:23)  Source: .CSF CSF  Gram Stain (02 Jan 2022 02:01):    No polymorphonuclear cells seen    No organisms seen    by cytocentrifuge      PT/INR - ( 01 Jan 2022 20:09 )   PT: 13.9 sec;   INR: 1.23 ratio         PTT - ( 01 Jan 2022 20:09 )  PTT:30.9 sec  CSF:    Total Nucleated Cell Count, CSF: 3 cells/uL (01-01-22 @ 22:44)  RBC Count - Spinal Fluid: 33 cells/uL (01-01-22 @ 22:44)          Protein, CSF: 22 mg/dL (01-01-22 @ 22:44)        Radiology:  CT Head No Cont:  (01 Jan 2022 20:25)     Neurology - Consult Note - 01-02-22  -  Bear Roper MD  PGY-2 Neurology  Spectra: [phone] ([facility]), [phone] ([facility])  -    Name: PANTERA JONES; 21y (2000)    Reason for Admission: Infection due to severe acute respiratory syndrome coronavirus 2 (SARS-CoV-2)    Chief Complaint:     HPI:  20 yo RH female w/ PMHx migraines, recent uncomplicated pregnancy, delivered 11/10/21 w/ epidural, who presents with multiple complaints, including diffuse headache, posterior neck and b/l shoulder pain/tightness, weakness in all extremities (lower > upper), blurry vision, nausea and vomiting.    Patient gave birth 11/10/2021 with epidural. Patient had an episode of N/V at that time, with no other significant symptoms. Approximately two weeks later, patient had onset of diffuse headache, which has been progressively worsening. It is associated with L sided head tenderness to palpation. Patient's migraines are usually relieved with sleep and NSAIDS, and are associated with photophobia and phonophobia. However, this headache is not minimally relieved w/ NSAIDS and sleep; they are also not associated w/ photo- or phonophobia. Headaches are not positional.    About 1 week ago, patient had onset of weakness -- LLE is limited by pain in knee, ankle, and foot; RLE is weaker than LLE (no pain); bilateral upper extremity weakness is equal. She states that her upper extremity weakness was preceded by pain, swelling, and notably painful when elevating her arms. She states that it is painful when she bears weight on her BLE. Per patient, her gait abnormality is a mix of light-headedness and weakness. She denies room-spinning.    About 2 days ago, patient had onset of blurry vision. She denies diplopia.    One day PTA, patient had episode of N/V.      Review of Systems:    CONSTITUTIONAL: No chills  EYES/ENT: +Blurry vision. No throat pain   NECK: +Stiffness  RESPIRATORY: No hemoptysis or shortness of breath  CARDIOVASCULAR: No chest pain or palpitations  GASTROINTESTINAL: +Bloody stool 1 week ago  NEUROLOGICAL: +As stated in HPI above  SKIN: No itching, burning, rashes, or lesions   All other review of systems is negative unless indicated above.    amoxicillin (Rash)      PMHx:   Migraines      PFHx:     PSuHx:       Medications:  MEDICATIONS  (STANDING):    MEDICATIONS  (PRN):      Vitals:  T(C): 37.4 (01-02-22 @ 01:30), Max: 39.1 (01-01-22 @ 18:05)  HR: 80 (01-02-22 @ 01:30) (80 - 124)  BP: 108/70 (01-02-22 @ 01:30) (108/70 - 124/79)  RR: 17 (01-02-22 @ 01:30) (17 - 18)  SpO2: 100% (01-02-22 @ 01:30) (99% - 100%)    Physical Examination:      General: in no acute distress, non-diaphoretic  Head: normocephalic, atraumatic  Eyes: non-icteric sclera, no conjunctival injection  Extremities: no lower extremity edema, no deformities    Neurologic:    - Mental Status: Alert, awake, oriented to person, place, and time; speech is fluent with intact naming, repetition, and comprehension;     - Cranial Nerves:  II: Visual fields are full to confrontation; pupils are equal, round, and reactive to light   III, IV, VI: Extraocular movements are intact without nystagmus  V: Facial sensation is intact in the V1-V3 distribution bilaterally  VII: Face is symmetric with normal eye closure and smile  VIII: Hearing is intact to conversation  IX, X: Uvula is midline and soft palate rises symmetrically  XII: Tongue protrudes in the midline    -Motor/Strength Testing:                                 Right           Left  Biceps                      4                 4  Triceps                     4-                 4-  Hand                  4+                 4+    Hip Flex                   5                  5  Knee Flex                 5                  5  Knee Ext	      5                  5  Dorsiflex                  4+                4+  Plantarflex               5                  5    - There is no pronator drift. Normal muscle bulk throughout.    - Reflexes:   Bicep (C5/C6):                  R 2+ --- L 2+   Patella (L3/L4) :                 R 1+ --- L 1+   Ankle (S1) :                       R 1+ --- L 1+     - Plant responses down bilaterally.    - Sensory: Intact throughout to light touch.   - Coordination: Finger-nose-finger intact without ataxia or dysmetria. Heel-shin slow, but intact on right. Heel-shin abnormal on left.  - Gait: broad base, staggering gait; predominantly walking on her heels; Romberg testing is negative.    Labs:                        11.6   5.95  )-----------( 365      ( 01 Jan 2022 20:09 )             36.8     01-01    138  |  105  |  7   ----------------------------<  94  3.6   |  22  |  0.74    Ca    9.3      01 Jan 2022 20:09    TPro  7.9  /  Alb  4.4  /  TBili  <0.2  /  DBili  x   /  AST  29  /  ALT  33  /  AlkPhos  87  01-01    CAPILLARY BLOOD GLUCOSE      POCT Blood Glucose.: 80 mg/dL (01 Jan 2022 18:25)    LIVER FUNCTIONS - ( 01 Jan 2022 20:09 )  Alb: 4.4 g/dL / Pro: 7.9 g/dL / ALK PHOS: 87 U/L / ALT: 33 U/L / AST: 29 U/L / GGT: x             Culture - CSF with Gram Stain (collected 02 Jan 2022 00:23)  Source: .CSF CSF  Gram Stain (02 Jan 2022 02:01):    No polymorphonuclear cells seen    No organisms seen    by cytocentrifuge      PT/INR - ( 01 Jan 2022 20:09 )   PT: 13.9 sec;   INR: 1.23 ratio         PTT - ( 01 Jan 2022 20:09 )  PTT:30.9 sec  CSF:    Total Nucleated Cell Count, CSF: 3 cells/uL (01-01-22 @ 22:44)  RBC Count - Spinal Fluid: 33 cells/uL (01-01-22 @ 22:44)    Protein, CSF: 22 mg/dL (01-01-22 @ 22:44)    Radiology:  CT Head + C-Spine No Cont:  (01 Jan 2022 20:25)    BRAIN:  No acute displaced calvarial fracture.  No acute hemorrhage or mass effect.    CERVICAL SPINE:  No acute displaced fracture or traumatic subluxation.    CT Thoracic and Lumbar Spine No Cont (01.01.22 @ 20:25)    No acute displaced fractures.  No acute traumatic subluxations.     Neurology - Consult Note - 01-02-22  -  Bear Roper MD  PGY-2 Neurology  Spectra: [phone] ([facility]), [phone] ([facility])  -    Name: PANTERA JONES; 21y (2000)    Reason for Admission: Infection due to severe acute respiratory syndrome coronavirus 2 (SARS-CoV-2)    Chief Complaint:     HPI:  22 yo RH female w/ PMHx migraines, recent uncomplicated pregnancy, delivered 11/10/21 w/ epidural, who presents with multiple complaints, including diffuse headache, posterior neck and b/l shoulder pain/tightness, weakness in all extremities (lower > upper), blurry vision, nausea and vomiting.    Patient gave birth 11/10/2021 with epidural. Patient had an episode of N/V at that time, with no other significant symptoms. Approximately two weeks later, patient had onset of diffuse headache, which has been progressively worsening. It is associated with L sided head tenderness to palpation. Patient's migraines are usually relieved with sleep and NSAIDS, and are associated with photophobia and phonophobia. However, this headache is only minimally relieved w/ NSAIDS and sleep; they are also not associated w/ photo- or phonophobia. Headaches are not positional.    About 1 week ago, patient had onset of weakness -- LLE is limited by pain in knee, ankle, and foot; RLE is weaker than LLE (no pain); bilateral upper extremity weakness is equal. She states that her upper extremity weakness was preceded by pain, swelling, and notably painful when elevating her arms. She states that it is painful when she bears weight on her BLE. Per patient, her gait abnormality is a mix of light-headedness and weakness. She denies room-spinning.    About 2 days ago, patient had onset of blurry vision. She denies diplopia.    One day PTA, patient had episode of N/V.      Review of Systems:    CONSTITUTIONAL: No chills  EYES/ENT: +Blurry vision. No throat pain   NECK: +Stiffness  RESPIRATORY: No hemoptysis or shortness of breath  CARDIOVASCULAR: No chest pain or palpitations  GASTROINTESTINAL: +Bloody stool 1 week ago  NEUROLOGICAL: +As stated in HPI above  SKIN: No itching, burning, rashes, or lesions   All other review of systems is negative unless indicated above.    amoxicillin (Rash)      PMHx:   Migraines      PFHx:     PSuHx:       Medications:  MEDICATIONS  (STANDING):    MEDICATIONS  (PRN):      Vitals:  T(C): 37.4 (01-02-22 @ 01:30), Max: 39.1 (01-01-22 @ 18:05)  HR: 80 (01-02-22 @ 01:30) (80 - 124)  BP: 108/70 (01-02-22 @ 01:30) (108/70 - 124/79)  RR: 17 (01-02-22 @ 01:30) (17 - 18)  SpO2: 100% (01-02-22 @ 01:30) (99% - 100%)    Physical Examination:      General: in no acute distress, non-diaphoretic  Head: normocephalic, atraumatic  Eyes: non-icteric sclera, no conjunctival injection  Extremities: no lower extremity edema, no deformities    Neurologic:    - Mental Status: Alert, awake, oriented to person, place, and time; speech is fluent with intact naming, repetition, and comprehension;     - Cranial Nerves:  II: Visual fields are full to confrontation; pupils are equal, round, and reactive to light   III, IV, VI: Extraocular movements are intact without nystagmus  V: Facial sensation is intact in the V1-V3 distribution bilaterally  VII: Face is symmetric with normal eye closure and smile  VIII: Hearing is intact to conversation  IX, X: Uvula is midline and soft palate rises symmetrically  XII: Tongue protrudes in the midline    -Motor/Strength Testing:                                 Right           Left  Biceps                      4                 4  Triceps                     4-                 4-  Hand                  4+                 4+    Hip Flex                   5                  5  Knee Flex                 5                  5  Knee Ext	      5                  5  Dorsiflex                  4+                4+  Plantarflex               5                  5    - There is no pronator drift. Normal muscle bulk throughout.    - Reflexes:   Bicep (C5/C6):                  R 2+ --- L 2+   Patella (L3/L4) :                 R 1+ --- L 1+   Ankle (S1) :                       R 1+ --- L 1+     - Plant responses down bilaterally.    - Sensory: Intact throughout to light touch.   - Coordination: Finger-nose-finger intact without ataxia or dysmetria. Heel-shin slow, but intact on right. Heel-shin abnormal on left.  - Gait: broad base, staggering gait; predominantly walking on her heels; Romberg testing is negative.    Labs:                        11.6   5.95  )-----------( 365      ( 01 Jan 2022 20:09 )             36.8     01-01    138  |  105  |  7   ----------------------------<  94  3.6   |  22  |  0.74    Ca    9.3      01 Jan 2022 20:09    TPro  7.9  /  Alb  4.4  /  TBili  <0.2  /  DBili  x   /  AST  29  /  ALT  33  /  AlkPhos  87  01-01    CAPILLARY BLOOD GLUCOSE      POCT Blood Glucose.: 80 mg/dL (01 Jan 2022 18:25)    LIVER FUNCTIONS - ( 01 Jan 2022 20:09 )  Alb: 4.4 g/dL / Pro: 7.9 g/dL / ALK PHOS: 87 U/L / ALT: 33 U/L / AST: 29 U/L / GGT: x             Culture - CSF with Gram Stain (collected 02 Jan 2022 00:23)  Source: .CSF CSF  Gram Stain (02 Jan 2022 02:01):    No polymorphonuclear cells seen    No organisms seen    by cytocentrifuge      PT/INR - ( 01 Jan 2022 20:09 )   PT: 13.9 sec;   INR: 1.23 ratio         PTT - ( 01 Jan 2022 20:09 )  PTT:30.9 sec  CSF:    Total Nucleated Cell Count, CSF: 3 cells/uL (01-01-22 @ 22:44)  RBC Count - Spinal Fluid: 33 cells/uL (01-01-22 @ 22:44)    Protein, CSF: 22 mg/dL (01-01-22 @ 22:44)    Radiology:  CT Head + C-Spine No Cont:  (01 Jan 2022 20:25)    BRAIN:  No acute displaced calvarial fracture.  No acute hemorrhage or mass effect.    CERVICAL SPINE:  No acute displaced fracture or traumatic subluxation.    CT Thoracic and Lumbar Spine No Cont (01.01.22 @ 20:25)    No acute displaced fractures.  No acute traumatic subluxations.     Neurology - Consult Note - 01-02-22  -  Bear Roper MD  PGY-2 Neurology  Spectra: [phone] ([facility]), [phone] ([facility])  -    Name: PANTERA JONES; 21y (2000)    Reason for Admission: Infection due to severe acute respiratory syndrome coronavirus 2 (SARS-CoV-2)    Chief Complaint:     HPI:  22 yo RH female w/ PMHx migraines, recent uncomplicated pregnancy, delivered 11/10/21 w/ epidural, who presents with multiple complaints, including diffuse headache, posterior neck and b/l shoulder pain/tightness, weakness in all extremities (lower > upper), blurry vision, nausea and vomiting.    Patient gave birth 11/10/2021 with epidural. Patient had an episode of N/V at that time, with no other significant symptoms. Approximately two weeks later, patient had onset of diffuse headache, which has been progressively worsening. It is associated with L sided head tenderness to palpation. Patient's migraines are usually relieved with sleep and NSAIDS, and are associated with photophobia and phonophobia. However, this headache is only minimally relieved w/ NSAIDS and sleep; they are also not associated w/ photo- or phonophobia. Headaches are not positional.    About 1 week ago, patient had onset of weakness -- LLE is limited by pain in knee, ankle, and foot; RLE is weaker than LLE (no pain); bilateral upper extremity weakness is equal. She states that her upper extremity weakness was preceded by pain, swelling, and notably painful when elevating her arms. She states that it is painful when she bears weight on her BLE. Per patient, her gait abnormality is a mix of light-headedness and weakness. She denies room-spinning.    About 2 days ago, patient had onset of blurry vision. She denies diplopia.    One day PTA, patient had episode of N/V.      Review of Systems:    CONSTITUTIONAL: No chills  EYES/ENT: +Blurry vision. No throat pain   NECK: +Stiffness  RESPIRATORY: No hemoptysis or shortness of breath  CARDIOVASCULAR: No chest pain or palpitations  GASTROINTESTINAL: +Bloody stool 1 week ago  NEUROLOGICAL: +As stated in HPI above  SKIN: No itching, burning, rashes, or lesions   All other review of systems is negative unless indicated above.    amoxicillin (Rash)      PMHx:   Migraines      PFHx:     PSuHx:       Medications:  MEDICATIONS  (STANDING):    MEDICATIONS  (PRN):      Vitals:  T(C): 37.4 (01-02-22 @ 01:30), Max: 39.1 (01-01-22 @ 18:05)  HR: 80 (01-02-22 @ 01:30) (80 - 124)  BP: 108/70 (01-02-22 @ 01:30) (108/70 - 124/79)  RR: 17 (01-02-22 @ 01:30) (17 - 18)  SpO2: 100% (01-02-22 @ 01:30) (99% - 100%)    Physical Examination:      General: in no acute distress, non-diaphoretic  Head: normocephalic, atraumatic  Eyes: non-icteric sclera, no conjunctival injection  Extremities: no lower extremity edema, no deformities    Neurologic:    - Mental Status: Alert, awake, oriented to person, place, and time; speech is fluent with intact naming, repetition, and comprehension;     - Cranial Nerves:  II: Visual fields are full to confrontation; pupils are equal, round, and reactive to light   III, IV, VI: Extraocular movements are intact without nystagmus  V: Facial sensation is intact in the V1-V3 distribution bilaterally  VII: Face is symmetric with normal eye closure and smile  VIII: Hearing is intact to conversation  IX, X: Uvula is midline and soft palate rises symmetrically  XII: Tongue protrudes in the midline    -Motor/Strength Testing:                                 Right           Left  Biceps                      4                 4  Triceps                     4-                 4-  Hand                  4+                 4+    Hip Flex                   5                  5  Knee Flex                 5                  5  Knee Ext	      5                  5  Dorsiflex                  4+                4+  Plantarflex               5                  5    - There is no pronator drift. Normal muscle bulk throughout.    - Reflexes:   Bicep (C5/C6):                  R 2+ --- L 2+   Patella (L3/L4) :                 R 1+ --- L 1+   Ankle (S1) :                       R 1+ --- L 1+     - Plant responses down bilaterally.    - Sensory: Intact throughout to light touch.   - Coordination: Finger-nose-finger intact without ataxia or dysmetria. Heel-shin slow, but intact on right. Heel-shin abnormal on left.  - Gait: broad base, staggering gait; predominantly walking on her heels; Romberg testing is negative.    - Kernig and Brucherellezinski: negative    Labs:                        11.6   5.95  )-----------( 365      ( 01 Jan 2022 20:09 )             36.8     01-01    138  |  105  |  7   ----------------------------<  94  3.6   |  22  |  0.74    Ca    9.3      01 Jan 2022 20:09    TPro  7.9  /  Alb  4.4  /  TBili  <0.2  /  DBili  x   /  AST  29  /  ALT  33  /  AlkPhos  87  01-01    CAPILLARY BLOOD GLUCOSE      POCT Blood Glucose.: 80 mg/dL (01 Jan 2022 18:25)    LIVER FUNCTIONS - ( 01 Jan 2022 20:09 )  Alb: 4.4 g/dL / Pro: 7.9 g/dL / ALK PHOS: 87 U/L / ALT: 33 U/L / AST: 29 U/L / GGT: x             Culture - CSF with Gram Stain (collected 02 Jan 2022 00:23)  Source: .CSF CSF  Gram Stain (02 Jan 2022 02:01):    No polymorphonuclear cells seen    No organisms seen    by cytocentrifuge      PT/INR - ( 01 Jan 2022 20:09 )   PT: 13.9 sec;   INR: 1.23 ratio         PTT - ( 01 Jan 2022 20:09 )  PTT:30.9 sec  CSF:    Total Nucleated Cell Count, CSF: 3 cells/uL (01-01-22 @ 22:44)  RBC Count - Spinal Fluid: 33 cells/uL (01-01-22 @ 22:44)    Protein, CSF: 22 mg/dL (01-01-22 @ 22:44)    Radiology:  CT Head + C-Spine No Cont:  (01 Jan 2022 20:25)    BRAIN:  No acute displaced calvarial fracture.  No acute hemorrhage or mass effect.    CERVICAL SPINE:  No acute displaced fracture or traumatic subluxation.    CT Thoracic and Lumbar Spine No Cont (01.01.22 @ 20:25)    No acute displaced fractures.  No acute traumatic subluxations.

## 2022-01-02 NOTE — H&P ADULT - ENDOCRINE
details…
date of service 12/23/2020  89yo male hx of afib, CAD, CHF BIBEMS with possible tia. pt states after dinner he had a period where he could not get the words out, he had difficulty picking up his dishes, but he managed to ambulate into another room of the house, some of it was witnessed by his daughter, daughter checked his BP at home and it was over 240 systolic.  pt had difficulty pressing his alert button, he was also shaking, states he felt like he was "in a dream"  no blurry or double vision, no slurred speech,pt has no complaints at this timeno fever, no loss of consciousness, no nausea, no numbness, no vomiting    admitted with  AMS confusion likely hypertensive encephalopathy with uncontrolled hypertension   CAD, CHF distolic   AFIB paroxysmal   Ess HTN , uncontrolled  APRYL with ckd3 base line vreat 1.3  onn 12/22 had food impaction post lunch seen by GI Dr Bray, resolved spontaneously     neuro checlks, neuro consult , w up for r/o CVA done  < from: MR Angio Head No Cont (12.22.20 @ 11:25) >  There is no acute infarction, acute hemorrhage, cerebral edema, or intracranial mass effect.    There is no intracranial arterial stenosis or large vessel occlusion.    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    < end of copied text >  < from: CT Head No Cont (12.21.20 @ 21:52) >  No acute intracranial hemorrhage or mass effect.   BP uncontrolled started on clonidine and hydralazine increased.  Daughter Thais wants father to be transferred to [facility] to service of DR Jb Sánchez [phone], I discussed with him and he accepted patient, transfer centre called and informed.  patient stable at discharge    Extensive chronic small vessel ischemic changes in the frontoparietal white matter and combination of dilated perivascular spaces and lacunar infarcts in the basal ganglia and thalami, unchanged.        80 minutes spent on this visit, 50% visit time spent in care co-ordination with other attendings and counselling patient  Advanced care planning discussed with patient/family. Advaced care planning forms reviewed,discussed /completed, 20 min spent  I have discussed care plan with patient and HCP,expressed understanding of problems treatment and their effect and side effects, alternatives in detail,I have asked if they have any questions and concerns and appropriately addressed them to best of my ability  Reviewed all diagonostic tests, lab results and drug drug interactions, and medications

## 2022-01-02 NOTE — CONSULT NOTE ADULT - ATTENDING COMMENTS
History of migraines.   One month of nonresolving headaches.   Followed by new weakness, dizziness and vision changes in the last week.   It was disabling so she came to the ED.   Here she was febrile for the first time.   Got antimicrobials empirically but CSF not infectious.   CT head unremarkable.   Now looks well, feels better, afebrile.   Not sure what's going on but if she had a true CNS infection I'd expect the CSF to be grossly abnormal by now given the duration of her symptoms but hers is very normal.   COVID can cause fevers and neurological symptoms but she has no respiratory issues to go along with it. Vaccinated Pfizer x2 completed July.   HIV negative.   No other focal infectious symptoms.   Let's monitor off antibiotics and see if anything shows up.     Jp Bean MD   Infectious Disease   Pager [phone]   After 5PM and on weekends please page fellow on call or call [phone] History of migraines.   One month of nonresolving headaches.   Followed by new weakness, dizziness and vision changes in the last week.   It was disabling so she came to the ED.   Here she was febrile for the first time.   Got antimicrobials empirically but CSF not infectious.   CT head unremarkable.   Now looks well, feels better, afebrile.   Not sure what's going on but if she had a true CNS infection I'd expect the CSF to be grossly abnormal by now given the duration of her symptoms but hers is very normal.   COVID can cause fevers and neurological symptoms but she has no respiratory issues to go along with it. Vaccinated Pfizer x2 completed July.   HIV negative.   No other focal infectious symptoms.   Let's monitor off antibiotics and see if anything shows up.     Noninfectious? Hemiplegic migraine can cause fever and deficits. Other type of migraine?     Jp Bean MD   Infectious Disease   Pager [phone]   After 5PM and on weekends please page fellow on call or call [phone]

## 2022-01-02 NOTE — H&P ADULT - ATTENDING COMMENTS
d 20yo Female with MHx of Migraines, bipolar d/o (off medications due to recent pregnancy), recent uncomplicated pregnancy (delivered 11/10/2021, had epidural) a/w ataxic gate, HA and blurry vision of unclear etiology; Found also to have viral sepsis due to Covid-19 r/o other etiology; 20yo Female with MHx of Migraines, bipolar d/o (off medications due to recent pregnancy), recent uncomplicated pregnancy (delivered 11/10/2021, had epidural) a/w ataxic gate, HA and blurry vision of unclear etiology; Found also to have viral sepsis due to Covid-19 r/o other etiology; Neurological consultation and input pending;     The above assessment and plan were supplemented and modified by attending where indicated;

## 2022-01-02 NOTE — CONSULT NOTE ADULT - ASSESSMENT
Ms Forman is a 21 year old lady with PMHx of Migraines, bipolar d/o (off medications due to recent pregnancy), recent uncomplicated pregnancy (delivered 11/10/2021, had epidural) presents to the ED with diffuse headache worse on left side of her head x 1 month with associated unstable gait, tightness at cervical spine, 1 week of weakness in b/l upper and lower extremities and blurry vision 2 days back. Patient reports headache started approximately  1 month ago, 2 weeks after delivery, and has progressively worsened. On day PTA, patient had episode of N/V. ED course: Pt was found to be Febrile to 102.4 on admission with no leukocytosis. s/p Dose of acyclovir, vancomycin and ceftriaxone for concern of meningitis.  Patient found to be COVID+     WORKUP So far  CT thoracic/Lumbar/Cervical/Head (1/1): Negative  CXR (1/1): Clear  CSF: Total Nucleated Cell Count 3 cells/uL , Glucose, CSF: 59 mg/dL, Protein, CSF: 22    Procalcitonin 0.07 (01-01)  CRP:  6.2 (01-01) ->  12.7 (01-02)  Ferritin, Serum: 51 (01-02)  D-Dimer Assay, Quantitative: 189 (01-02)  RVP: COVID 19 (1/1)  UA and CXR (1/1): Negative  Patient is vaccinated and received 2 doses of Pfizer.     IMPRESSION  COVID 19 Infection  Headache  amoxicillin (Rash) -> Tolerated cephalosporin as a child    RECOMMENDATION  Febrile to 102.4 on admission with no leukocytosis  s/p Dose of acyclovir, vancomycin and ceftriaxone.   Mildly elevated CRP. All other inflammatory markers normal  CSF without pleocytosis, normal glucose and protein, gram stain negative: Low suspicion for infection  Pending CSF PCR, HSV PCR, VDRL  Fevers explained by covid infection  On RA, no pulmonary symptoms and clear lungs on CXR -> Doesn't qualify for remdesevirm decadron or MAB  Continue Contact and Airborne Isolation precautions    Pt to be seen    Jean-Paul Eaton MD, PGY4   ID fellow  [employer] Teams Preferred/ Pager: [phone]  [facility] pager ID: [phone] (would prefer to text page for any new consult or question, please include name/location and best call back number)  After 5pm/weekends call [phone]     Ms Forman is a 21 year old lady with PMHx of Migraines, bipolar d/o (off medications due to recent pregnancy), recent uncomplicated pregnancy (delivered 11/10/2021, had epidural) presents to the ED with diffuse headache worse on left side of her head x 1 month with associated unstable gait, tightness at cervical spine, 1 week of weakness in b/l upper and lower extremities and blurry vision 2 days back. Patient reports headache started approximately  1 month ago, 2 weeks after delivery, and has progressively worsened. On day PTA, patient had episode of N/V. ED course: Pt was found to be Febrile to 102.4 on admission with no leukocytosis. s/p Dose of acyclovir, vancomycin and ceftriaxone for concern of meningitis.  Patient found to be COVID+     WORKUP So far  CT thoracic/Lumbar/Cervical/Head (1/1): Negative  CXR (1/1): Clear  CSF: Total Nucleated Cell Count 3 cells/uL , Glucose, CSF: 59 mg/dL, Protein, CSF: 22    Procalcitonin 0.07 (01-01)  CRP:  6.2 (01-01) ->  12.7 (01-02)  Ferritin, Serum: 51 (01-02)  D-Dimer Assay, Quantitative: 189 (01-02)  RVP: COVID 19 (1/1)  UA and CXR (1/1): Negative  Patient is vaccinated and received 2 doses of Pfizer.     IMPRESSION  COVID 19 Infection  Headache  amoxicillin (Rash) -> Tolerated cephalosporin as a child    RECOMMENDATION  Febrile to 102.4 on admission with no leukocytosis  s/p Dose of acyclovir, vancomycin and ceftriaxone.   Mildly elevated CRP. All other inflammatory markers normal  CSF without pleocytosis, normal glucose and protein, gram stain negative: Lower suspicion for infection, unclear eitiology  Pending CSF PCR, HSV PCR, VDRL  Monitor off abx. WIll f/u blood cx and CSF studies  Fevers could be all explained by covid infection  On RA, no pulmonary symptoms and clear lungs on CXR -> Doesn't qualify for remdesevirm decadron or MAB  Continue Contact and Airborne Isolation precautions    Pt seen and examined. Case d/w attending and primary team    Jean-Paul Eaton MD, PGY4   ID fellow  [employer] Teams Preferred/ Pager: [phone]  [facility] pager ID: [phone] (would prefer to text page for any new consult or question, please include name/location and best call back number)  After 5pm/weekends call [phone]     Ms Forman is a 21 year old lady with PMHx of Migraines, bipolar d/o (off medications due to recent pregnancy), recent uncomplicated pregnancy (delivered 11/10/2021, had epidural) presents to the ED with diffuse headache worse on left side of her head x 1 month with associated unstable gait, tightness at cervical spine, 1 week of weakness in b/l upper and lower extremities and blurry vision 2 days back. Patient reports headache started approximately  1 month ago, 2 weeks after delivery, and has progressively worsened. On day PTA, patient had episode of N/V. ED course: Pt was found to be Febrile to 102.4 on admission with no leukocytosis. s/p Dose of acyclovir, vancomycin and ceftriaxone for concern of meningitis.  Patient found to be COVID+     WORKUP So far  CT thoracic/Lumbar/Cervical/Head (1/1): Negative  CXR (1/1): Clear  CSF: Total Nucleated Cell Count 3 cells/uL , Glucose, CSF: 59 mg/dL, Protein, CSF: 22    Procalcitonin 0.07 (01-01)  CRP:  6.2 (01-01) ->  12.7 (01-02)  Ferritin, Serum: 51 (01-02)  D-Dimer Assay, Quantitative: 189 (01-02)  RVP: COVID 19 (1/1)  UA and CXR (1/1): Negative  Patient is vaccinated and received 2 doses of Pfizer.     IMPRESSION  COVID 19 Infection  Headache  amoxicillin (Rash) -> Tolerated cephalosporin as a child    RECOMMENDATION  Febrile to 102.4 on admission with no leukocytosis  s/p Dose of acyclovir, vancomycin and ceftriaxone.   Mildly elevated CRP. All other inflammatory markers normal  CSF without pleocytosis, normal glucose and protein, gram stain negative: Lower suspicion for infection, unclear eitiology  Pending CSF PCR, HSV PCR, VDRL  Monitor off abx. WIll f/u blood cx and CSF studies  Fevers could be all explained by covid infection  On RA, no pulmonary symptoms and clear lungs on CXR -> Doesn't qualify for remdesevirm decadron or MAB  Continue Contact and Airborne Isolation precautions    Pt seen and examined. Case d/w attending and primary team    Jean-Paul Eaton MD, PGY4   ID fellow  [employer] Teams Preferred/ Pager: [phone]  [facility] pager ID: [phone] (would prefer to text page for any new consult or question, please include name/location and best call back number)  After 5pm/weekends call [phone]

## 2022-01-02 NOTE — H&P ADULT - PROBLEM SELECTOR PLAN 5
Patient incidentally found to be COVID+.  Patient vaccinated with Pfizer, second dose received in July.  Patient 100% on room air. Continue monitoring.  Will order COVID inflammatory markers. CT brain, C,T,L spine without acute findings.  Headache not similar to patient's migraines.  Rule out infectious causes. Patient s/p acyclovir 800 mg IVPB in ED for suspected Herpes and vancomycin for CNS infection. Will hold antibiotics for now.  Further plan as above

## 2022-01-02 NOTE — H&P ADULT - PROBLEM SELECTOR PLAN 2
Patient with bilateral leg weakness. Patient with subjective bilateral leg weakness  CT brain, C,T,L spine without acute findings  Pending Neurological evaluation  Deferring specific MRI imaging to Neurology team - the pt to be seen by Neurology attending (this was d/w Dr. Roper, Neurology resident) Patient with subjective bilateral leg weakness  CT brain, C,T,L spine without acute findings  Pending Neurological evaluation  Deferring specific MRI imaging to Neurology team - the pt to be seen by Neurology attending (this was d/w Dr. Roper, Neurology resident)  f/u CPK

## 2022-01-02 NOTE — PATIENT PROFILE ADULT - FALL HARM RISK - RISK INTERVENTIONS

## 2022-01-02 NOTE — H&P ADULT - NSHPPHYSICALEXAM_GEN_ALL_CORE
Vital Signs Last 24 Hrs  T(C): 37.6 (02 Jan 2022 05:30), Max: 39.1 (01 Jan 2022 18:05)  T(F): 99.7 (02 Jan 2022 05:30), Max: 102.4 (01 Jan 2022 18:05)  HR: 108 (02 Jan 2022 05:30) (80 - 124)  BP: 119/68 (02 Jan 2022 05:30) (108/70 - 124/79)  BP(mean): --  RR: 17 (02 Jan 2022 05:30) (17 - 18)  SpO2: 100% (02 Jan 2022 05:30) (99% - 100%)

## 2022-01-02 NOTE — H&P ADULT - PROBLEM SELECTOR PLAN 3
Patient with 2 days of blurry vision. Patient with 2 days of blurry vision with elements of generalized weakness. Given above symptoms and recent pregnancy concerned for hypopituitarism;  -f/u Free T4, T3, Cortisol  -Holding MRI brain/ sella turcica pending official recommendations from the Neurology team as d/w d/w Dr. Roper, Neurology resident; Patient with 2 days of blurry vision with elements of generalized weakness. Given above symptoms and recent pregnancy concerned for hypopituitarism;  -f/u Free T4, T3, Cortisol  -Holding MRI brain/ sella turcica for now pending official recommendations from the Neurology team as d/w Dr. Roper, Neurology resident;

## 2022-01-02 NOTE — CONSULT NOTE ADULT - SUBJECTIVE AND OBJECTIVE BOX
Patient is a 21y old  Female who presents with a chief complaint of Unsteady gait, headache, COVID-19 (2022 03:21)    HPI: Ms Forman is a 21 year old lady with PMHx of Migraines, bipolar d/o (off medications due to recent pregnancy), recent uncomplicated pregnancy (delivered 11/10/2021, had epidural) presents to the ED with diffuse headache worse on left side of her head x 1 month with associated unstable gait, tightness at cervical spine, 1 week of weakness in b/l upper and lower extremities and blurry vision 2 days back. Patient reports headache started approximately  1 month ago, 2 weeks after delivery, and has progressively worsened. Patient describes headache as constant and diffuse with pain being worse on left side. Patient reports she is usually able to get relief from her migraines with ibuprofen and hot shower, but reports she has not been able to get any relief. Patient denies associated photophobia.   About 1 week ago, patient had onset of weakness -- LLE is limited by pain in knee, ankle, and foot; RLE is weaker than LLE (no pain); bilateral upper extremity weakness is equal. She states that her upper extremity weakness was preceded by pain, swelling, and notably painful when elevating her arms. She states that it is painful when she bears weight on her BLE. Per patient, her gait abnormality is a mix of light-headedness and weakness. She denies room-spinning. About 2 days ago, patient had onset of blurry vision. She denies diplopia. Patient denies chest pain, nausea and vomiting, tremors. One day PTA, patient had episode of N/V.    ED course: Pt was found to be Febrile to 102.4 on admission with no leukocytosis. s/p Dose of acyclovir, vancomycin and ceftriaxone for concern of meningitis.  Patient found to be COVID+ Patient is vaccinated and received 2 doses of Pfizer. Labs showed Mildly elevated CRP. All other inflammatory markers normal. CSF without pleocytosis, normal glucose and protein, gram stain negative:     REVIEW OF SYSTEMS  [  ] ROS unobtainable because:    [ x ] All other systems negative except as noted below    Constitutional:  [ ] fever [ ] chills  [ ] weight loss  [ ]night sweat  [ ]poor appetite/PO intake [ ]fatigue   Skin:  [ ] rash [ ] phlebitis	  Eyes: [ ] icterus [ ] pain  [ ] discharge	  ENMT: [ ] sore throat  [ ] thrush [ ] ulcers [ ] exudates [ ]anosmia  Respiratory: [ ] dyspnea [ ] hemoptysis [ ] cough [ ] sputum	  Cardiovascular:  [ ] chest pain [ ] palpitations [ ] edema	  Gastrointestinal:  [ ] nausea [ ] vomiting [ ] diarrhea [ ] constipation [ ] pain	  Genitourinary:  [ ] dysuria [ ] frequency [ ] hematuria [ ] discharge [ ] flank pain  [ ] incontinence  Musculoskeletal:  [ ] myalgias [ ] arthralgias [ ] arthritis  [ ] back pain  Neurological:  [ ] headache [ ] weakness [ ] seizures  [ ] confusion/altered mental status    prior hospital charts reviewed [V]  primary team notes reviewed [V]  other consultant notes reviewed [V]    PAST MEDICAL & SURGICAL HISTORY:  Migraines  Seizure: As a child. No recent seizures.  No significant past surgical history    SOCIAL HISTORY:  - Denied smoking/vaping/alcohol/recreational drug use    FAMILY HISTORY:  No pertinent family history in first degree relatives        Allergies  amoxicillin (Rash)        ANTIMICROBIALS:      ANTIMICROBIALS (past 90 days):  MEDICATIONS  (STANDING):  acyclovir IVPB   266 mL/Hr IV Intermittent (22 @ 22:10)    cefTRIAXone   IVPB   100 mL/Hr IV Intermittent (22 @ 20:30)    vancomycin  IVPB.   250 mL/Hr IV Intermittent (22 @ 21:04)        OTHER MEDS:   MEDICATIONS  (STANDING):      VITALS:  Vital Signs Last 24 Hrs  T(F): 99.7 (22 @ 05:30), Max: 102.4 (22 @ 18:05)    Vital Signs Last 24 Hrs  HR: 108 (22 @ 05:30) (80 - 124)  BP: 119/68 (22 @ 05:30) (108/70 - 124/79)  RR: 17 (22 @ 05:30)  SpO2: 100% (22 @ 05:30) (99% - 100%)  Wt(kg): --    EXAM:    GA: NAD, AOx3  HEENT: oral cavity no lesion  CV: nl S1/S2, no RMG  Lungs: CTAB, No distress  Abd: BS+, soft, nontender, no rebounding pain  Ext: no edema  Neuro: No focal deficits  Skin: Intact  IV: no phlebitis    Labs:                        10.8   4.50  )-----------( 329      ( 2022 07:26 )             33.2         138  |  105  |  6<L>  ----------------------------<  87  3.5   |  21<L>  |  0.69    Ca    9.0      2022 07:26  Phos  3.8       Mg     1.60         TPro  7.9  /  Alb  4.4  /  TBili  <0.2  /  DBili  x   /  AST  29  /  ALT  33  /  AlkPhos  87        WBC Trend:  WBC Count: 4.50 (22 @ 07:26)  WBC Count: 5.95 (22 @ 20:09)      Auto Neutrophil #: 3.23 K/uL (22 @ 07:26)  Auto Neutrophil #: 4.83 K/uL (22 @ 20:09)      Creatine Trend:  Creatinine, Serum: 0.69 ()  Creatinine, Serum: 0.74 ()      Liver Biochemical Testing Trend:  Alanine Aminotransferase (ALT/SGPT): 33 ()  Aspartate Aminotransferase (AST/SGOT): 29 (22 @ 20:09)  Bilirubin Total, Serum: <0.2 ()      Trend LDH      Auto Eosinophil %: 0.4 % (22 @ 07:26)  Auto Eosinophil %: 0.8 % (22 @ 20:09)      Urinalysis Basic - ( 2022 20:19 )    Color: Light Yellow / Appearance: Clear / S.022 / pH: x  Gluc: x / Ketone: Negative  / Bili: Negative / Urobili: <2 mg/dL   Blood: x / Protein: Negative / Nitrite: Negative   Leuk Esterase: Negative / RBC: 1 /HPF / WBC 1 /HPF   Sq Epi: x / Non Sq Epi: 1 /HPF / Bacteria: Negative        MICROBIOLOGY:        Culture - CSF with Gram Stain (collected 2022 00:23)  Source: .CSF CSF      HIV-1/2 Combo Result: Nonreact (22 @ 20:09)                    Rapid RVP Result: Detected ( @ 20:32)          Procalcitonin, Serum: 0.07 ()    C-Reactive Protein, Serum: 12.7 ()  C-Reactive Protein, Serum: 6.2 ()    Ferritin, Serum: 51 ()    D-Dimer Assay, Quantitative: 189 ()          Blood Gas Venous - Lactate: 1.6 ( @ 20:13)    A1C with Estimated Average Glucose Result: 4.8 % (22 @ 07:26)    Sedimentation Rate, Erythrocyte: 17 mm/hr (22 @ 20:09)    CSF:    CSF Appearance: Clear (22 @ 22:44)  Total Nucleated Cell Count, CSF: 3 cells/uL (22 @ 22:44)  RBC Count - Spinal Fluid: 33 cells/uL (22 @ 22:44)  CSF Segmented Neutrophils: 6 % (22 @ 22:44)  CSF Lymphocytes: 81 % (22 @ 22:44)  Glucose, CSF: 59 mg/dL (22 @ 22:44)  Protein, CSF: 22 mg/dL (22 @ 22:44)          Protein, CSF: 22 mg/dL (22 @ 22:44)        RADIOLOGY:  imaging below personally reviewed    Xray Chest 1 View- PORTABLE-Urgent:  (2022 23:10)        < from: Xray Chest 1 View- PORTABLE-Urgent (22 @ 23:10) >  Clear lungs.    < end of copied text >      < from: CT Thoracic/lumbar Spine No Cont (22 @ 20:26) >  No acute displaced fractures.  No acute traumatic subluxations.    MRI is more sensitive for soft tissue/ligamentous injury may be obtained   as clinically warranted if symptoms persist or worsen.    < end of copied text >    CT Head No Cont:   ACC: [id number] EXAM:  CT CERVICAL SPINE                        ACC: [id number] EXAM:  CT BRAIN                        PROCEDURE DATE:  2022    IMPRESSION:  BRAIN:No acute displaced calvarial fracture.No acute hemorrhage or mass effect.  CERVICAL SPINE:No acute displaced fracture or traumatic subluxation.  --- End ofReport ---   Patient is a 21y old  Female who presents with a chief complaint of Unsteady gait, headache, COVID-19 (2022 03:21)    HPI: Ms Forman is a 21 year old lady with PMHx of Migraines, bipolar d/o (off medications due to recent pregnancy), recent uncomplicated pregnancy (delivered 11/10/2021, had epidural) presents to the ED with diffuse headache worse on left side of her head x 1 month with associated unstable gait, tightness at cervical spine, 1 week of weakness in b/l upper and lower extremities and blurry vision 2 days back. Patient reports headache started approximately  1 month ago, 2 weeks after delivery, and has progressively worsened. Patient describes headache as constant and diffuse with pain being worse on left side. Patient reports she is usually able to get relief from her migraines with ibuprofen and hot shower, but reports she has not been able to get any relief. Patient denies associated photophobia.   About 1 week ago, patient had onset of weakness -- LLE is limited by pain in knee, ankle, and foot; RLE is weaker than LLE (no pain); bilateral upper extremity weakness is equal. She states that her upper extremity weakness was preceded by pain, swelling, and notably painful when elevating her arms. She states that it is painful when she bears weight on her BLE. Per patient, her gait abnormality is a mix of light-headedness and weakness. She denies room-spinning. About 2 days ago, patient had onset of blurry vision. She denies diplopia. Patient denies chest pain, nausea and vomiting, tremors. One day PTA, patient had episode of N/V.    ED course: Pt was found to be Febrile to 102.4 on admission with no leukocytosis. s/p Dose of acyclovir, vancomycin and ceftriaxone for concern of meningitis.  Patient found to be COVID+ Patient is vaccinated and received 2 doses of Pfizer. Labs showed Mildly elevated CRP. All other inflammatory markers normal. CSF without pleocytosis, normal glucose and protein, gram stain negative:     REVIEW OF SYSTEMS  [  ] ROS unobtainable because:    [ x ] All other systems negative except as noted below    Constitutional:  [ ] fever [ ] chills  [ ] weight loss  [ ]night sweat  [ ]poor appetite/PO intake [ ]fatigue   Skin:  [ ] rash [ ] phlebitis	  Eyes: [ ] icterus [ ] pain  [ ] discharge	  ENMT: [ ] sore throat  [ ] thrush [ ] ulcers [ ] exudates [ ]anosmia  Respiratory: [ ] dyspnea [ ] hemoptysis [ ] cough [ ] sputum	  Cardiovascular:  [ ] chest pain [ ] palpitations [ ] edema	  Gastrointestinal:  [ ] nausea [ ] vomiting [ ] diarrhea [ ] constipation [ ] pain	  Genitourinary:  [ ] dysuria [ ] frequency [ ] hematuria [ ] discharge [ ] flank pain  [ ] incontinence  Musculoskeletal:  [ ] myalgias [ ] arthralgias [ ] arthritis  [ ] back pain  Neurological:  [ ] headache [ ] weakness [ ] seizures  [ ] confusion/altered mental status    prior hospital charts reviewed [V]  primary team notes reviewed [V]  other consultant notes reviewed [V]    PAST MEDICAL & SURGICAL HISTORY:  Migraines  Seizure: As a child. No recent seizures.  No significant past surgical history    SOCIAL HISTORY:  - Denied smoking/vaping/alcohol/recreational drug use    FAMILY HISTORY:  No pertinent family history in first degree relatives        Allergies  amoxicillin (Rash)        ANTIMICROBIALS:      ANTIMICROBIALS (past 90 days):  MEDICATIONS  (STANDING):  acyclovir IVPB   266 mL/Hr IV Intermittent (22 @ 22:10)    cefTRIAXone   IVPB   100 mL/Hr IV Intermittent (22 @ 20:30)    vancomycin  IVPB.   250 mL/Hr IV Intermittent (22 @ 21:04)        OTHER MEDS:   MEDICATIONS  (STANDING):      VITALS:  Vital Signs Last 24 Hrs  T(F): 99.7 (22 @ 05:30), Max: 102.4 (22 @ 18:05)    Vital Signs Last 24 Hrs  HR: 108 (22 @ 05:30) (80 - 124)  BP: 119/68 (22 @ 05:30) (108/70 - 124/79)  RR: 17 (22 @ 05:30)  SpO2: 100% (22 @ 05:30) (99% - 100%)  Wt(kg): --    EXAM:    PHYSICAL EXAM:  General:  non-toxic, AOx3  HEAD/EYES: PERRL, white sclera   ENT:  normal, No thrush and no pharyngeal exudate  Neck: Supple, no rigidity  Cardiovascular:   No murmur,  normal S1 and S2  Respiratory: clear to ausculation bilaterally  GI:  soft, non-tender, normal bowel sounds  : no velázquez, no CVA tenderness   Musculoskeletal:  no synovitis  Neurologic: non-focal exam, no meningeal signs  Skin: no rash  Lymph:  no lymphadenopathy  Psychiatric: Cooperative, appropriate affect, alert & oriented  Lines:  no phlebitis         Labs:                        10.8   4.50  )-----------( 329      ( 2022 07:26 )             33.2         138  |  105  |  6<L>  ----------------------------<  87  3.5   |  21<L>  |  0.69    Ca    9.0      2022 07:26  Phos  3.8       Mg     1.60         TPro  7.9  /  Alb  4.4  /  TBili  <0.2  /  DBili  x   /  AST  29  /  ALT  33  /  AlkPhos  87        WBC Trend:  WBC Count: 4.50 (22 @ 07:26)  WBC Count: 5.95 (22 @ 20:09)      Auto Neutrophil #: 3.23 K/uL (22 @ 07:26)  Auto Neutrophil #: 4.83 K/uL (22 @ 20:09)      Creatine Trend:  Creatinine, Serum: 0.69 ()  Creatinine, Serum: 0.74 ()      Liver Biochemical Testing Trend:  Alanine Aminotransferase (ALT/SGPT): 33 ()  Aspartate Aminotransferase (AST/SGOT): 29 (22 @ 20:09)  Bilirubin Total, Serum: <0.2 ()      Trend LDH      Auto Eosinophil %: 0.4 % (22 @ 07:26)  Auto Eosinophil %: 0.8 % (22 @ 20:09)      Urinalysis Basic - ( 2022 20:19 )    Color: Light Yellow / Appearance: Clear / S.022 / pH: x  Gluc: x / Ketone: Negative  / Bili: Negative / Urobili: <2 mg/dL   Blood: x / Protein: Negative / Nitrite: Negative   Leuk Esterase: Negative / RBC: 1 /HPF / WBC 1 /HPF   Sq Epi: x / Non Sq Epi: 1 /HPF / Bacteria: Negative        MICROBIOLOGY:        Culture - CSF with Gram Stain (collected 2022 00:23)  Source: .CSF CSF      HIV-1/2 Combo Result: Nonreact (22 @ 20:09)                    Rapid RVP Result: Detected ( @ 20:32)          Procalcitonin, Serum: 0.07 ()    C-Reactive Protein, Serum: 12.7 ()  C-Reactive Protein, Serum: 6.2 ()    Ferritin, Serum: 51 ()    D-Dimer Assay, Quantitative: 189 ()          Blood Gas Venous - Lactate: 1.6 ( @ 20:13)    A1C with Estimated Average Glucose Result: 4.8 % (22 @ 07:26)    Sedimentation Rate, Erythrocyte: 17 mm/hr (22 @ 20:09)    CSF:    CSF Appearance: Clear (22 @ 22:44)  Total Nucleated Cell Count, CSF: 3 cells/uL (22 @ 22:44)  RBC Count - Spinal Fluid: 33 cells/uL (22 @ 22:44)  CSF Segmented Neutrophils: 6 % (22 @ 22:44)  CSF Lymphocytes: 81 % (22 @ 22:44)  Glucose, CSF: 59 mg/dL (22 @ 22:44)  Protein, CSF: 22 mg/dL (22 @ 22:44)          Protein, CSF: 22 mg/dL (22 @ 22:44)        RADIOLOGY:  imaging below personally reviewed    Xray Chest 1 View- PORTABLE-Urgent:  (2022 23:10)        < from: Xray Chest 1 View- PORTABLE-Urgent (22 @ 23:10) >  Clear lungs.    < end of copied text >      < from: CT Thoracic/lumbar Spine No Cont (22 @ 20:26) >  No acute displaced fractures.  No acute traumatic subluxations.    MRI is more sensitive for soft tissue/ligamentous injury may be obtained   as clinically warranted if symptoms persist or worsen.    < end of copied text >    CT Head No Cont:   ACC: [id number] EXAM:  CT CERVICAL SPINE                        ACC: [id number] EXAM:  CT BRAIN                        PROCEDURE DATE:  2022    IMPRESSION:  BRAIN:No acute displaced calvarial fracture.No acute hemorrhage or mass effect.  CERVICAL SPINE:No acute displaced fracture or traumatic subluxation.  --- End ofReport ---   Patient is a 21y old  Female who presents with a chief complaint of Unsteady gait, headache, COVID-19 (2022 03:21)    HPI: Ms Forman is a 21 year old lady with PMHx of Migraines, bipolar d/o (off medications due to recent pregnancy), recent uncomplicated pregnancy (delivered 11/10/2021, had epidural) presents to the ED with diffuse headache worse on left side of her head x 1 month with associated unstable gait, tightness at cervical spine, 1 week of weakness in b/l upper and lower extremities and blurry vision 2 days back. Patient reports headache started approximately  1 month ago, 2 weeks after delivery, and has progressively worsened. Patient describes headache as constant and diffuse with pain being worse on left side. Patient reports she is usually able to get relief from her migraines with ibuprofen and hot shower, but reports she has not been able to get any relief. Patient denies associated photophobia.   About 1 week ago, patient had onset of weakness -- LLE is limited by pain in knee, ankle, and foot; RLE is weaker than LLE (no pain); bilateral upper extremity weakness is equal. She states that her upper extremity weakness was preceded by pain, swelling, and notably painful when elevating her arms. She states that it is painful when she bears weight on her BLE. Per patient, her gait abnormality is a mix of light-headedness and weakness. She denies room-spinning. About 2 days ago, patient had onset of blurry vision. She denies diplopia. Patient denies chest pain, nausea and vomiting, tremors. One day PTA, patient had episode of N/V.    ED course: Pt was found to be Febrile to 102.4 on admission with no leukocytosis. s/p Dose of acyclovir, vancomycin and ceftriaxone for concern of meningitis.  Patient found to be COVID+ Patient is vaccinated and received 2 doses of Pfizer. Labs showed Mildly elevated CRP. All other inflammatory markers normal. CSF without pleocytosis, normal glucose and protein, gram stain negative:     REVIEW OF SYSTEMS  [  ] ROS unobtainable because:    [ x ] All other systems negative except as noted below    Constitutional:  [ ] fever [ ] chills  [ ] weight loss  [ ]night sweat  [ ]poor appetite/PO intake [ ]fatigue   Skin:  [ ] rash [ ] phlebitis	  Eyes: [ ] icterus [ ] pain  [ ] discharge	  ENMT: [ ] sore throat  [ ] thrush [ ] ulcers [ ] exudates [ ]anosmia  Respiratory: [ ] dyspnea [ ] hemoptysis [ ] cough [ ] sputum	  Cardiovascular:  [ ] chest pain [ ] palpitations [ ] edema	  Gastrointestinal:  [ ] nausea [ ] vomiting [ ] diarrhea [ ] constipation [ ] pain	  Genitourinary:  [ ] dysuria [ ] frequency [ ] hematuria [ ] discharge [ ] flank pain  [ ] incontinence  Musculoskeletal:  [ ] myalgias [ ] arthralgias [ ] arthritis  [ ] back pain  Neurological:  [ ] headache [ ] weakness [ ] seizures  [ ] confusion/altered mental status    prior hospital charts reviewed [V]  primary team notes reviewed [V]  other consultant notes reviewed [V]    PAST MEDICAL & SURGICAL HISTORY:  Migraines  Seizure: As a child. No recent seizures.  No significant past surgical history    SOCIAL HISTORY:  - Denied smoking/vaping/alcohol/recreational drug use    FAMILY HISTORY:  No pertinent family history in first degree relatives        Allergies  amoxicillin (Rash)        ANTIMICROBIALS:      ANTIMICROBIALS (past 90 days):  MEDICATIONS  (STANDING):  acyclovir IVPB   266 mL/Hr IV Intermittent (22 @ 22:10)    cefTRIAXone   IVPB   100 mL/Hr IV Intermittent (22 @ 20:30)    vancomycin  IVPB.   250 mL/Hr IV Intermittent (22 @ 21:04)        OTHER MEDS:   MEDICATIONS  (STANDING):      VITALS:  Vital Signs Last 24 Hrs  T(F): 99.7 (22 @ 05:30), Max: 102.4 (22 @ 18:05)    Vital Signs Last 24 Hrs  HR: 108 (22 @ 05:30) (80 - 124)  BP: 119/68 (22 @ 05:30) (108/70 - 124/79)  RR: 17 (22 @ 05:30)  SpO2: 100% (22 @ 05:30) (99% - 100%)  Wt(kg): --    EXAM:    PHYSICAL EXAM:  General:  non-toxic, AOx3  HEAD/EYES: PERRL, white sclera   Neck: Supple, no rigidity  Cardiovascular:  regular rate   Respiratory: nonlabored on room air   GI:  soft, nondistended   : no velázquez, no CVA tenderness   Musculoskeletal:  no synovitis  Neurologic: non-focal exam, no meningeal signs  Skin: no rash  Lymph:  no lymphadenopathy  Psychiatric: Cooperative, appropriate affect, alert & oriented  Lines:  no phlebitis         Labs:                        10.8   4.50  )-----------( 329      ( 2022 07:26 )             33.2         138  |  105  |  6<L>  ----------------------------<  87  3.5   |  21<L>  |  0.69    Ca    9.0      2022 07:26  Phos  3.8       Mg     1.60         TPro  7.9  /  Alb  4.4  /  TBili  <0.2  /  DBili  x   /  AST  29  /  ALT  33  /  AlkPhos  87        WBC Trend:  WBC Count: 4.50 (22 @ 07:26)  WBC Count: 5.95 (22 @ 20:09)      Auto Neutrophil #: 3.23 K/uL (22 @ 07:26)  Auto Neutrophil #: 4.83 K/uL (22 @ 20:09)      Creatine Trend:  Creatinine, Serum: 0.69 ()  Creatinine, Serum: 0.74 ()      Liver Biochemical Testing Trend:  Alanine Aminotransferase (ALT/SGPT): 33 ()  Aspartate Aminotransferase (AST/SGOT): 29 (22 @ 20:09)  Bilirubin Total, Serum: <0.2 ()      Trend LDH      Auto Eosinophil %: 0.4 % (22 @ 07:26)  Auto Eosinophil %: 0.8 % (22 @ 20:09)      Urinalysis Basic - ( 2022 20:19 )    Color: Light Yellow / Appearance: Clear / S.022 / pH: x  Gluc: x / Ketone: Negative  / Bili: Negative / Urobili: <2 mg/dL   Blood: x / Protein: Negative / Nitrite: Negative   Leuk Esterase: Negative / RBC: 1 /HPF / WBC 1 /HPF   Sq Epi: x / Non Sq Epi: 1 /HPF / Bacteria: Negative        MICROBIOLOGY:        Culture - CSF with Gram Stain (collected 2022 00:23)  Source: .CSF CSF      HIV-1/2 Combo Result: Nonreact (22 @ 20:09)                    Rapid RVP Result: Detected ( @ 20:32)          Procalcitonin, Serum: 0.07 ()    C-Reactive Protein, Serum: 12.7 ()  C-Reactive Protein, Serum: 6.2 ()    Ferritin, Serum: 51 ()    D-Dimer Assay, Quantitative: 189 ()          Blood Gas Venous - Lactate: 1.6 ( @ 20:13)    A1C with Estimated Average Glucose Result: 4.8 % (22 @ 07:26)    Sedimentation Rate, Erythrocyte: 17 mm/hr (22 @ 20:09)    CSF:    CSF Appearance: Clear (22 @ 22:44)  Total Nucleated Cell Count, CSF: 3 cells/uL (22 @ 22:44)  RBC Count - Spinal Fluid: 33 cells/uL (22 @ 22:44)  CSF Segmented Neutrophils: 6 % (22 @ 22:44)  CSF Lymphocytes: 81 % (22 @ 22:44)  Glucose, CSF: 59 mg/dL (22 @ 22:44)  Protein, CSF: 22 mg/dL (22 @ 22:44)          Protein, CSF: 22 mg/dL (22 @ 22:44)        RADIOLOGY:  imaging below personally reviewed    Xray Chest 1 View- PORTABLE-Urgent:  (2022 23:10)        < from: Xray Chest 1 View- PORTABLE-Urgent (22 @ 23:10) >  Clear lungs.    < end of copied text >      < from: CT Thoracic/lumbar Spine No Cont (22 @ 20:26) >  No acute displaced fractures.  No acute traumatic subluxations.    MRI is more sensitive for soft tissue/ligamentous injury may be obtained   as clinically warranted if symptoms persist or worsen.    < end of copied text >    CT Head No Cont:   ACC: [id number] EXAM:  CT CERVICAL SPINE                        ACC: [id number] EXAM:  CT BRAIN                        PROCEDURE DATE:  2022    IMPRESSION:  BRAIN:No acute displaced calvarial fracture.No acute hemorrhage or mass effect.  CERVICAL SPINE:No acute displaced fracture or traumatic subluxation.  --- End ofReport ---

## 2022-01-02 NOTE — H&P ADULT - NSHPLABSRESULTS_GEN_ALL_CORE
Vital Signs Last 24 Hrs  T(C): 37.4 (2022 01:30), Max: 39.1 (2022 18:05)  T(F): 99.4 (2022 01:30), Max: 102.4 (2022 18:05)  HR: 80 (2022 01:30) (80 - 124)  BP: 108/70 (2022 01:30) (108/70 - 124/79)  BP(mean): --  RR: 17 (2022 01:30) (17 - 18)  SpO2: 100% (2022 01:30) (99% - 100%)                          11.6   5.95  )-----------( 365      ( 2022 20:09 )             36.8       138  |  105  |  7   ----------------------------<  94  3.6   |  22  |  0.74    Ca    9.3      2022 20:09    TPro  7.9  /  Alb  4.4  /  TBili  <0.2  /  DBili  x   /  AST  29  /  ALT  33  /  AlkPhos  87      PT/INR - ( 2022 20:09 )   PT: 13.9 sec;   INR: 1.23 ratio         PTT - ( 2022 20:09 )  PTT:30.9 sec    HCG<5.0    CSF  Total cells: 64  Glucose CSF 59  Protein CSF 22  RBC count 33  CSF appearance: clear.  Appearance Colorless  CSF color  colorless  Total nucleated cell count, CSF 3  CSF segmented neutrophils: 6  CSF lymphocytes: 81  CSF monocytes/macrophages: 13    Urinalysis Basic - ( 2022 20:19 )    Color: Light Yellow / Appearance: Clear / S.022 / pH: x  Gluc: x / Ketone: Negative  / Bili: Negative / Urobili: <2 mg/dL   Blood: x / Protein: Negative / Nitrite: Negative   Leuk Esterase: Negative / RBC: 1 /HPF / WBC 1 /HPF   Sq Epi: x / Non Sq Epi: 1 /HPF / Bacteria: Negative    COVID 19 PCR: Positive.    HIV: Non-reactive    CT thoracolumbar spine  FINDINGS:    VERTEBRAL BODIES:  Vertebral body heights are maintained. No acute   displaced fracture.  ALIGNMENT:  The normal thoracic kyphosis is maintained. No acute   traumatic subluxations.  INTERVERTEBRAL DISC SPACES: The disc spaces are preserved. No significant   central or neuroforaminal stenosis.  SPINAL CANAL: No intra or extra medullary collections. Canal contents are   better evaluated on MRI.  RIBS: No acute displaced rib fracture.  PARASPINAL SOFT TISSUES:  Unremarkable.  INTRATHORACIC AND INTRA-ABDOMINAL SOFT TISSUES: Unremarkable.    IMPRESSION:    No acute displaced fractures.  No acute traumatic subluxations.    MRI is more sensitive for soft tissue/ligamentous injury may be obtained   as clinically warranted if symptoms persist or worsen.    20:13 - VBG - pH: 7.37  | pCO2: 42    | pO2: 42    | Lactate: 1.6 Vital Signs Last 24 Hrs  T(C): 37.4 (2022 01:30), Max: 39.1 (2022 18:05)  T(F): 99.4 (2022 01:30), Max: 102.4 (2022 18:05)  HR: 80 (2022 01:30) (80 - 124)  BP: 108/70 (2022 01:30) (108/70 - 124/79)  BP(mean): --  RR: 17 (2022 01:30) (17 - 18)  SpO2: 100% (:30) (99% - 100%)                          11.6   5.95  )-----------( 365      ( 2022 20:09 )             36.8       138  |  105  |  7   ----------------------------<  94  3.6   |  22  |  0.74    Ca    9.3      2022 20:09    TPro  7.9  /  Alb  4.4  /  TBili  <0.2  /  DBili  x   /  AST  29  /  ALT  33  /  AlkPhos  87      PT/INR - ( 2022 20:09 )   PT: 13.9 sec;   INR: 1.23 ratio         PTT - ( 2022 20:09 )  PTT:30.9 sec    HCG<5.0    CSF  Total cells: 64  Glucose CSF 59  Protein CSF 22  RBC count 33  CSF appearance: clear.  Appearance Colorless  CSF color  colorless  Total nucleated cell count, CSF 3  CSF segmented neutrophils: 6  CSF lymphocytes: 81  CSF monocytes/macrophages: 13    Urinalysis Basic - ( 2022 20:19 )    Color: Light Yellow / Appearance: Clear / S.022 / pH: x  Gluc: x / Ketone: Negative  / Bili: Negative / Urobili: <2 mg/dL   Blood: x / Protein: Negative / Nitrite: Negative   Leuk Esterase: Negative / RBC: 1 /HPF / WBC 1 /HPF   Sq Epi: x / Non Sq Epi: 1 /HPF / Bacteria: Negative    20:13 - VBG - pH: 7.37  | pCO2: 42    | pO2: 42    | Lactate: 1.6    COVID 19 PCR: Positive.    HIV: Non-reactive    CT thoracolumbar spine  FINDINGS:    VERTEBRAL BODIES:  Vertebral body heights are maintained. No acute   displaced fracture.  ALIGNMENT:  The normal thoracic kyphosis is maintained. No acute   traumatic subluxations.  INTERVERTEBRAL DISC SPACES: The disc spaces are preserved. No significant   central or neuroforaminal stenosis.  SPINAL CANAL: No intra or extra medullary collections. Canal contents are   better evaluated on MRI.  RIBS: No acute displaced rib fracture.  PARASPINAL SOFT TISSUES:  Unremarkable.  INTRATHORACIC AND INTRA-ABDOMINAL SOFT TISSUES: Unremarkable.    IMPRESSION:    No acute displaced fractures.  No acute traumatic subluxations.    MRI is more sensitive for soft tissue/ligamentous injury may be obtained   as clinically warranted if symptoms persist or worsen.    CT Head and C-spine:  BRAIN:    No acute displaced calvarial fracture.  No acute hemorrhage or mass effect.    CERVICAL SPINE:    No acute displaced fracture or traumatic subluxation.    EKG:    CXR:   FINDINGS:    The lungs are clear.  There is no pneumothorax or large pleural effusion.  Heart size cannot be accurately assessed in this projection.  No acute osseous abnormality. CXR: clear lungs, no plural effusions, no pneumothorax ---- personally interpreted     ----Personally reviewed the labs below:                         11.6   5.95  )-----------( 365      ( 2022 20:09 )             36.8       138  |  105  |  7   ----------------------------<  94  3.6   |  22  |  0.74    Ca    9.3      2022 20:09    TPro  7.9  /  Alb  4.4  /  TBili  <0.2  /  DBili  x   /  AST  29  /  ALT  33  /  AlkPhos  87      PT/INR - ( 2022 20:09 )   PT: 13.9 sec;   INR: 1.23 ratio         PTT - ( 2022 20:09 )  PTT:30.9 sec    HCG<5.0    CSF  Total cells: 64  Glucose CSF 59  Protein CSF 22  RBC count 33  CSF appearance: clear.  Appearance Colorless  CSF color  colorless  Total nucleated cell count, CSF 3  CSF segmented neutrophils: 6  CSF lymphocytes: 81  CSF monocytes/macrophages: 13    Urinalysis Basic - ( 2022 20:19 )    Color: Light Yellow / Appearance: Clear / S.022 / pH: x  Gluc: x / Ketone: Negative  / Bili: Negative / Urobili: <2 mg/dL   Blood: x / Protein: Negative / Nitrite: Negative   Leuk Esterase: Negative / RBC: 1 /HPF / WBC 1 /HPF   Sq Epi: x / Non Sq Epi: 1 /HPF / Bacteria: Negative    20:13 - VBG - pH: 7.37  | pCO2: 42    | pO2: 42    | Lactate: 1.6    COVID 19 PCR: Positive.    HIV: Non-reactive      ----Personally reviewed the radiological imaging below:     CT thoracolumbar spine  FINDINGS:  VERTEBRAL BODIES:  Vertebral body heights are maintained. No acute   displaced fracture.  ALIGNMENT:  The normal thoracic kyphosis is maintained. No acute   traumatic subluxations.  INTERVERTEBRAL DISC SPACES: The disc spaces are preserved. No significant   central or neuroforaminal stenosis.  SPINAL CANAL: No intra or extra medullary collections. Canal contents are   better evaluated on MRI.  RIBS: No acute displaced rib fracture.  PARASPINAL SOFT TISSUES:  Unremarkable.  INTRATHORACIC AND INTRA-ABDOMINAL SOFT TISSUES: Unremarkable.    IMPRESSION:  No acute displaced fractures.  No acute traumatic subluxations.  MRI is more sensitive for soft tissue/ligamentous injury may be obtained   as clinically warranted if symptoms persist or worsen.  CT Head and C-spine:  BRAIN:  No acute displaced calvarial fracture.  No acute hemorrhage or mass effect.  CERVICAL SPINE:  No acute displaced fracture or traumatic subluxation. CXR: clear lungs, no plural effusions, no pneumothorax ---- personally interpreted     EKG, 22, 3:58am, sinus tach, 111bpm, qtc, no acute Tw or ST changes  --- personally interpreted     ----Personally reviewed the labs below:                         11.6   5.95  )-----------( 365      ( 2022 20:09 )             36.8       138  |  105  |  7   ----------------------------<  94  3.6   |  22  |  0.74    Ca    9.3      2022 20:09    TPro  7.9  /  Alb  4.4  /  TBili  <0.2  /  DBili  x   /  AST  29  /  ALT  33  /  AlkPhos  87      PT/INR - ( 2022 20:09 )   PT: 13.9 sec;   INR: 1.23 ratio         PTT - ( 2022 20:09 )  PTT:30.9 sec    HCG<5.0    CSF  Total cells: 64  Glucose CSF 59  Protein CSF 22  RBC count 33  CSF appearance: clear.  Appearance Colorless  CSF color  colorless  Total nucleated cell count, CSF 3  CSF segmented neutrophils: 6  CSF lymphocytes: 81  CSF monocytes/macrophages: 13    Urinalysis Basic - ( 2022 20:19 )    Color: Light Yellow / Appearance: Clear / S.022 / pH: x  Gluc: x / Ketone: Negative  / Bili: Negative / Urobili: <2 mg/dL   Blood: x / Protein: Negative / Nitrite: Negative   Leuk Esterase: Negative / RBC: 1 /HPF / WBC 1 /HPF   Sq Epi: x / Non Sq Epi: 1 /HPF / Bacteria: Negative    20:13 - VBG - pH: 7.37  | pCO2: 42    | pO2: 42    | Lactate: 1.6    COVID 19 PCR: Positive.    HIV: Non-reactive      ----Personally reviewed the radiological imaging below:     CT thoracolumbar spine  FINDINGS:  VERTEBRAL BODIES:  Vertebral body heights are maintained. No acute   displaced fracture.  ALIGNMENT:  The normal thoracic kyphosis is maintained. No acute   traumatic subluxations.  INTERVERTEBRAL DISC SPACES: The disc spaces are preserved. No significant   central or neuroforaminal stenosis.  SPINAL CANAL: No intra or extra medullary collections. Canal contents are   better evaluated on MRI.  RIBS: No acute displaced rib fracture.  PARASPINAL SOFT TISSUES:  Unremarkable.  INTRATHORACIC AND INTRA-ABDOMINAL SOFT TISSUES: Unremarkable.    IMPRESSION:  No acute displaced fractures.  No acute traumatic subluxations.  MRI is more sensitive for soft tissue/ligamentous injury may be obtained   as clinically warranted if symptoms persist or worsen.  CT Head and C-spine:  BRAIN:  No acute displaced calvarial fracture.  No acute hemorrhage or mass effect.  CERVICAL SPINE:  No acute displaced fracture or traumatic subluxation.

## 2022-01-02 NOTE — DISCHARGE NOTE PROVIDER - NSDCCPCAREPLAN_GEN_ALL_CORE_FT
PRINCIPAL DISCHARGE DIAGNOSIS  Diagnosis: Unsteady gait  Assessment and Plan of Treatment: Unclear etiology.   Patient wishes to leave against medical advice and has the mental capacity to make this decision. All risks were explained, including death. The attending physician was made aware.

## 2022-01-02 NOTE — CONSULT NOTE ADULT - ASSESSMENT
22 yo RH female w/ PMHx migraines, recent uncomplicated pregnancy, delivered 11/10/21 w/ epidural, who presents with multiple complaints, including diffuse headache, posterior neck and b/l shoulder pain/tightness, weakness in all extremities (lower > upper), blurry vision, nausea and vomiting. N/V occurred on day of childbirth. Approximately 2 weeks s/p childbirth, patient w/ onset diffuse progressively worsening HA a/w TTP of L head, minimally alleviated w/ NSAIDS, and unlike her h/o migraines. Approximately 1 week ago, onset of BUE weakness (started as BUE pain and swelling) and BLE weakness (RLE subjective weakness, LLE pain in joints, painful on weight bearing). Patient's gait abnormality described as mix of light-headedness and weakness (no room-spinning). Two days PTA, onset blurry vision (no diplopia). One day PTA, N/V. Neurological examination notable for BUE symmetric weakness; BLE symmetric full strength; symmetric DTRs (2+ upper, ~1+ lower); intact LT throughout; heel-shin abnormal on L; gait that is broad base, staggering, and walks on heels (heel walking is normal for her); negative Romberg. CTH w/o: negative. CT C/T/L-spine w/o: negative. Found to be COVID+.    Impression: ***    Plan:   - ***    * Case and plan to be discussed with Neurology Attending Dr. Ashby * 20 yo RH female w/ PMHx migraines, recent uncomplicated pregnancy, delivered 11/10/21 w/ epidural, who presents with multiple complaints, including diffuse headache, posterior neck and b/l shoulder pain/tightness, weakness in all extremities (lower > upper), blurry vision, nausea and vomiting. N/V occurred on day of childbirth. Approximately 2 weeks s/p childbirth, patient w/ onset diffuse progressively worsening HA a/w TTP of L head, minimally alleviated w/ NSAIDS, and unlike her h/o migraines. Approximately 1 week ago, onset of BUE weakness (started as BUE pain and swelling) and BLE weakness (RLE subjective weakness, LLE pain in joints, painful on weight bearing). Patient's gait abnormality described as mix of light-headedness and weakness (no room-spinning). Two days PTA, onset blurry vision (no diplopia). One day PTA, N/V. Neurology consulted for this constellation of symptoms. Neurological examination notable for BUE symmetric weakness; BLE symmetric full strength; symmetric DTRs (2+ upper, ~1+ lower); intact LT throughout; heel-shin abnormal on L; gait that is broad base, staggering, and walks on heels (heel walking is normal for her); negative Romberg. CTH w/o: negative. CT C/T/L-spine w/o: negative. CSF cell count: negative. Found to be COVID+. Patient s/p acyclovir 800mg IVPB in ED for suspected herpes infection and s/p vancomycin 1g and ceftriaxone 2g for CNS infection.    Impression: ***    Plan:   - ***    * Case and plan to be discussed with Neurology Attending Dr. Ashby * 20 yo RH female w/ PMHx migraines, recent uncomplicated pregnancy, delivered 11/10/21 w/ epidural, who presents with multiple complaints, including diffuse headache, posterior neck and b/l shoulder pain/tightness, weakness in all extremities (lower > upper), blurry vision, nausea and vomiting. N/V occurred on day of childbirth. Approximately 2 weeks s/p childbirth, patient w/ onset diffuse progressively worsening HA a/w TTP of L head, minimally alleviated w/ NSAIDS, and unlike her h/o migraines. Approximately 1 week ago, onset of BUE weakness (started as BUE pain and swelling) and BLE weakness (RLE subjective weakness, LLE pain in joints, painful on weight bearing). Patient's gait abnormality described as mix of light-headedness and weakness (no room-spinning). Two days PTA, onset blurry vision (no diplopia). One day PTA, N/V. Neurology consulted for this constellation of symptoms. Neurological examination notable for BUE symmetric weakness; BLE symmetric full strength; symmetric DTRs (2+ upper, ~1+ lower); intact LT throughout; heel-shin abnormal on L; gait that is broad base, staggering, and walks on heels (heel walking is normal for her); negative Romberg. CTH w/o: negative. CT C/T/L-spine w/o: negative. CSF cell count: negative. Found to be COVID+. Patient s/p acyclovir 800mg IVPB in ED for suspected herpes infection and s/p vancomycin 1g and ceftriaxone 2g for CNS infection.    Impression: Progressively worsening headache, blurry vision, and other neurologic symptoms not fitting a specific vascular distrubution, in the postpartum period; unclear etiology, however sx c/f cerebral venous sinus thrombosis.     Plan:   - CT venogram brain  - CTA head w/o contrast; CTA neck w/ contrast  - Further workup and management will be guided based on the results from CT studies above  - Q4H neuro checks  - STAT CT Head w/o contrast for any acute changes in neurological examination  - Fall precautions, seizure precautions    - COVID management, as per primary team        Call [facility] #[phone] for questions or concerns    * Case and plan to be discussed with Neurology Attending Dr. Ashby *

## 2022-01-02 NOTE — H&P ADULT - PROBLEM SELECTOR PLAN 1
Admit to medicine, continue monitoring.  CT brain, C,T,L spine without acute findings.  Rule out infectious causes. Patient s/p acyclovir 800 mg IVPB in ED for suspected Herpes and vancomycin for CNS infection. Will hold antibiotics for now.    Neuro recs appreciated. Admit to medicine, continue monitoring.  CT brain, C,T,L spine without acute findings.  Headache not similar to patient's migraines.  Rule out infectious causes. Patient s/p acyclovir 800 mg IVPB in ED for suspected Herpes and vancomycin for CNS infection. Will hold antibiotics for now.  Awaiting Neuro recs. Likely viral sepsis due to Covid-19  vs autoimmune process vs less likely bacterial process   Febrile, Tmax= 102.4, Tachycardic, HR= 120s-90s;   RVP negative  Covid-19 PCR (+)  CXR clear lungs  UA not c/w UTI  HIV1/2 negative  LP CSF not c/w bacterial meningitis, Procalcitonin=0.07   Hold Abx   f/u BCx  f/u further CSF analysis Likely viral sepsis due to Covid-19 and/ or autoimmune process vs less likely bacterial process;   Febrile, Tmax= 102.4, Tachycardic, HR= 120s-90s;   RVP negative  Covid-19 PCR (+)  CXR clear lungs  UA not c/w UTI  HIV1/2 negative  LP CSF not c/w bacterial meningitis, Procalcitonin=0.07, CSF Gram stain negative   Hold Abx   f/u BCx  f/u  closely CSF PCR, VDRL, HSV 1/2 PCR  f/u syphilis screen Likely viral sepsis due to Covid-19 and/ or autoimmune process vs less likely bacterial process;   Febrile, Tmax= 102.4, Tachycardic, HR= 120s-90s;   RVP negative  Covid-19 PCR (+)  CXR clear lungs  UA not c/w UTI  HIV1/2 negative  LP CSF not c/w bacterial meningitis, Procalcitonin=0.07, CSF Gram stain negative   Hold Abx   f/u BCx  f/u  closely CSF PCR, VDRL, HSV 1/2 PCR  f/u syphilis screen  ID consultation in AM

## 2022-01-02 NOTE — CONSULT NOTE ADULT - ATTENDING COMMENTS
Patient with a number of complaints including HA and blurry vision for past month, and unsteady gait for a few days. THe headache is different than her migraines. Exam today showed wide-based gait with negative romberg. Motor and sensory exam normal. PERRL.   LP already negative for infection. She is 6 weeks postpartum, but would still rule out SVT with CTA/V. If negative, would get MRI given that she is cOVID+ and thus at risk for CVA as well.

## 2022-01-02 NOTE — H&P ADULT - PROBLEM SELECTOR PLAN 4
Unstable wide base gait; Possible demyelinating process;  -Holding MRI evaluation of the spine pending recommendations from Neurology;

## 2022-01-02 NOTE — DISCHARGE NOTE PROVIDER - HOSPITAL COURSE
22 y/o F with PMH of Migraines, recent uncomplicated pregnancy (delivered 11/10/2021, had epidural) presents to the ED with diffuse headache worse on left side of her head and ataxic gait.     headache + fever + ataxia + B/L blurry vision   - s/p LP   - CT H/C/T/L - negative  - CTA H/N, CTV H - negative   - house neuro cs   - s/p vanco, ceftriaxone, acyclovir in ED  - house ID cs - Lower suspicion for infection, unclear eitiology. Monitor off abx    COVID+  - room air   - vaccinated with Pfizer, second dose received in July     bipolar disorder   - has been off Abilify due to pregnancy  - reports that medication is supposed to be restarted next week      Patient wishes to leave against medical advice and has the mental capacity to make this decision. All risks were explained, including death. The attending physician was made aware.

## 2022-01-04 LAB
CULTURE RESULTS: NO GROWTH — SIGNIFICANT CHANGE UP
SPECIMEN SOURCE: SIGNIFICANT CHANGE UP
VDRL CSF-TITR: SIGNIFICANT CHANGE UP

## 2022-01-07 LAB
CULTURE RESULTS: SIGNIFICANT CHANGE UP
CULTURE RESULTS: SIGNIFICANT CHANGE UP
SPECIMEN SOURCE: SIGNIFICANT CHANGE UP
SPECIMEN SOURCE: SIGNIFICANT CHANGE UP
